# Patient Record
Sex: FEMALE | Race: WHITE | HISPANIC OR LATINO | Employment: UNEMPLOYED | URBAN - NONMETROPOLITAN AREA
[De-identification: names, ages, dates, MRNs, and addresses within clinical notes are randomized per-mention and may not be internally consistent; named-entity substitution may affect disease eponyms.]

---

## 2019-04-11 LAB — FINAL PATHOLOGIC DIAGNOSIS: NORMAL

## 2022-10-31 LAB
C TRACH DNA SPEC QL NAA+PROBE: NORMAL
N GONORRHOEA DNA SPEC QL NAA+PROBE: NORMAL

## 2022-12-13 LAB
GLUCOSE SERPL-MCNC: 151 MG/DL
HIV 1+2 AB+HIV1 P24 AG SERPL QL IA: NONREACTIVE
RUBELLA IMMUNE STATUS: NORMAL
T PALLIDUM AB SER QL: NONREACTIVE

## 2022-12-15 LAB
GLUCOSE, 1 HOUR: 192 MG/DL
GLUCOSE, 2 HOUR: 162 MG/DL
GLUCOSE, 3 HOUR: 112 MG/DL
GLUCOSE, FASTING: 91 MG/DL

## 2023-01-04 LAB
GLUCOSE SERPL-MCNC: 151 MG/DL
GLUCOSE, 1 HOUR: 192 MG/DL
GLUCOSE, 2 HOUR: 162 MG/DL
GLUCOSE, 3 HOUR: 112 MG/DL
GLUCOSE, FASTING: 91 MG/DL

## 2023-04-21 LAB
ABO + RH BLD: NORMAL
ANTIBODY SCREEN: NORMAL

## 2023-04-27 ENCOUNTER — ROUTINE PRENATAL (OUTPATIENT)
Dept: OBSTETRICS AND GYNECOLOGY | Facility: CLINIC | Age: 28
End: 2023-04-27
Payer: MEDICAID

## 2023-04-27 VITALS — WEIGHT: 255 LBS | SYSTOLIC BLOOD PRESSURE: 132 MMHG | DIASTOLIC BLOOD PRESSURE: 70 MMHG

## 2023-04-27 DIAGNOSIS — Z98.891 HISTORY OF CESAREAN SECTION: ICD-10-CM

## 2023-04-27 DIAGNOSIS — O09.299 PREGNANCY WITH POOR OBSTETRIC HISTORY: ICD-10-CM

## 2023-04-27 DIAGNOSIS — O24.410 DIET CONTROLLED GESTATIONAL DIABETES MELLITUS (GDM) IN THIRD TRIMESTER: ICD-10-CM

## 2023-04-27 DIAGNOSIS — O99.213 OBESITY AFFECTING PREGNANCY IN THIRD TRIMESTER: ICD-10-CM

## 2023-04-27 DIAGNOSIS — Z3A.30 30 WEEKS GESTATION OF PREGNANCY: Primary | ICD-10-CM

## 2023-04-27 DIAGNOSIS — Z30.09 UNWANTED FERTILITY: ICD-10-CM

## 2023-04-27 PROCEDURE — 99999 PR PBB SHADOW E&M-NEW PATIENT-LVL III: CPT | Mod: PBBFAC,,, | Performed by: OBSTETRICS & GYNECOLOGY

## 2023-04-27 PROCEDURE — 99204 OFFICE O/P NEW MOD 45 MIN: CPT | Mod: TH,S$PBB,, | Performed by: OBSTETRICS & GYNECOLOGY

## 2023-04-27 PROCEDURE — 99204 PR OFFICE/OUTPT VISIT, NEW, LEVL IV, 45-59 MIN: ICD-10-PCS | Mod: TH,S$PBB,, | Performed by: OBSTETRICS & GYNECOLOGY

## 2023-04-27 PROCEDURE — 99203 OFFICE O/P NEW LOW 30 MIN: CPT | Mod: PBBFAC,TH | Performed by: OBSTETRICS & GYNECOLOGY

## 2023-04-27 PROCEDURE — 99999 PR PBB SHADOW E&M-NEW PATIENT-LVL III: ICD-10-PCS | Mod: PBBFAC,,, | Performed by: OBSTETRICS & GYNECOLOGY

## 2023-04-27 RX ORDER — ASPIRIN 325 MG
325 TABLET, DELAYED RELEASE (ENTERIC COATED) ORAL DAILY
COMMUNITY
End: 2023-04-27 | Stop reason: DRUGHIGH

## 2023-04-27 RX ORDER — ASPIRIN 81 MG/1
81 TABLET ORAL DAILY
Qty: 150 TABLET | Refills: 2 | Status: SHIPPED | OUTPATIENT
Start: 2023-04-27 | End: 2023-04-30 | Stop reason: SDUPTHER

## 2023-04-27 NOTE — PROGRESS NOTES
New OB late transfer of care from [facility].  Patient with gestational diabetes that she reports diet controlled.  Blood sugar logs reviewed and within normal limits for the most part.  Occasional outlier ears especially after supper.  Discussed being more mindful of carb intake at that time.  She reports that she has had C-sections in the past and that she would like a tubal ligation during this delivery.  She states she was seeing MFM in [place].  Will send referral to MFM and Ophthalmology.  Refills of test strips and aspirin sent pharmacy.  All questions answered and precautions given.  Return to clinic in 2 weeks with BPP

## 2023-05-01 ENCOUNTER — PATIENT MESSAGE (OUTPATIENT)
Dept: OBSTETRICS AND GYNECOLOGY | Facility: CLINIC | Age: 28
End: 2023-05-01
Payer: MEDICAID

## 2023-05-08 ENCOUNTER — OFFICE VISIT (OUTPATIENT)
Dept: MATERNAL FETAL MEDICINE | Facility: CLINIC | Age: 28
End: 2023-05-08
Payer: MEDICAID

## 2023-05-08 ENCOUNTER — PROCEDURE VISIT (OUTPATIENT)
Dept: MATERNAL FETAL MEDICINE | Facility: CLINIC | Age: 28
End: 2023-05-08
Payer: MEDICAID

## 2023-05-08 VITALS
HEART RATE: 78 BPM | HEIGHT: 62 IN | SYSTOLIC BLOOD PRESSURE: 121 MMHG | BODY MASS INDEX: 47.51 KG/M2 | WEIGHT: 258.19 LBS | DIASTOLIC BLOOD PRESSURE: 64 MMHG

## 2023-05-08 DIAGNOSIS — O99.213 OBESITY AFFECTING PREGNANCY IN THIRD TRIMESTER: ICD-10-CM

## 2023-05-08 DIAGNOSIS — Z87.59 HISTORY OF GESTATIONAL HYPERTENSION: ICD-10-CM

## 2023-05-08 DIAGNOSIS — Z87.59 HISTORY OF PRIOR PREGNANCY WITH IUGR NEWBORN: ICD-10-CM

## 2023-05-08 DIAGNOSIS — O24.410 DIET CONTROLLED GESTATIONAL DIABETES MELLITUS (GDM) IN THIRD TRIMESTER: ICD-10-CM

## 2023-05-08 PROCEDURE — 3078F DIAST BP <80 MM HG: CPT | Mod: CPTII,S$GLB,, | Performed by: OBSTETRICS & GYNECOLOGY

## 2023-05-08 PROCEDURE — 99204 PR OFFICE/OUTPT VISIT, NEW, LEVL IV, 45-59 MIN: ICD-10-PCS | Mod: 25,TH,S$GLB, | Performed by: OBSTETRICS & GYNECOLOGY

## 2023-05-08 PROCEDURE — 1160F PR REVIEW ALL MEDS BY PRESCRIBER/CLIN PHARMACIST DOCUMENTED: ICD-10-PCS | Mod: CPTII,S$GLB,, | Performed by: OBSTETRICS & GYNECOLOGY

## 2023-05-08 PROCEDURE — 3008F BODY MASS INDEX DOCD: CPT | Mod: CPTII,S$GLB,, | Performed by: OBSTETRICS & GYNECOLOGY

## 2023-05-08 PROCEDURE — 76811 PR US, OB FETAL EVAL & EXAM, TRANSABDOM,FIRST GESTATION: ICD-10-PCS | Mod: 26,S$GLB,, | Performed by: OBSTETRICS & GYNECOLOGY

## 2023-05-08 PROCEDURE — 1159F MED LIST DOCD IN RCRD: CPT | Mod: CPTII,S$GLB,, | Performed by: OBSTETRICS & GYNECOLOGY

## 2023-05-08 PROCEDURE — 1159F PR MEDICATION LIST DOCUMENTED IN MEDICAL RECORD: ICD-10-PCS | Mod: CPTII,S$GLB,, | Performed by: OBSTETRICS & GYNECOLOGY

## 2023-05-08 PROCEDURE — 3078F PR MOST RECENT DIASTOLIC BLOOD PRESSURE < 80 MM HG: ICD-10-PCS | Mod: CPTII,S$GLB,, | Performed by: OBSTETRICS & GYNECOLOGY

## 2023-05-08 PROCEDURE — 1160F RVW MEDS BY RX/DR IN RCRD: CPT | Mod: CPTII,S$GLB,, | Performed by: OBSTETRICS & GYNECOLOGY

## 2023-05-08 PROCEDURE — 3008F PR BODY MASS INDEX (BMI) DOCUMENTED: ICD-10-PCS | Mod: CPTII,S$GLB,, | Performed by: OBSTETRICS & GYNECOLOGY

## 2023-05-08 PROCEDURE — 76811 OB US DETAILED SNGL FETUS: CPT | Mod: 26,S$GLB,, | Performed by: OBSTETRICS & GYNECOLOGY

## 2023-05-08 PROCEDURE — 3074F PR MOST RECENT SYSTOLIC BLOOD PRESSURE < 130 MM HG: ICD-10-PCS | Mod: CPTII,S$GLB,, | Performed by: OBSTETRICS & GYNECOLOGY

## 2023-05-08 PROCEDURE — 3074F SYST BP LT 130 MM HG: CPT | Mod: CPTII,S$GLB,, | Performed by: OBSTETRICS & GYNECOLOGY

## 2023-05-08 PROCEDURE — 99204 OFFICE O/P NEW MOD 45 MIN: CPT | Mod: 25,TH,S$GLB, | Performed by: OBSTETRICS & GYNECOLOGY

## 2023-05-08 RX ORDER — METFORMIN HYDROCHLORIDE 500 MG/1
500 TABLET ORAL
Qty: 30 TABLET | Refills: 1 | Status: ON HOLD | OUTPATIENT
Start: 2023-05-08 | End: 2023-06-18 | Stop reason: HOSPADM

## 2023-05-08 NOTE — ASSESSMENT & PLAN NOTE
The patient was counseled on the  risks associated with obesity which include and increased risk of hypertension, preeclampsia, gestational diabetes, venous thromboembolic disease,  delivery, macrosomia (with resultant shoulder dystocia, obstetric sphincter injury), IUFD, longer first stage of labor, decreased TOLAC success rate, emergent & scheduled  & complications of  (prolonged OR time, delayed delivery, excessive EBL, infection, wound separation/infection, higher spinal failure rate, more difficult intubation).  Obesity is also associated with fetal anomalies, specifically neural tube defects.  Studies have shown that the rate of complication increases with rising BMI and thus pregnancies with maternal morbid obesity are at increased risk.      BMI 47    Recommendations:   Discussed that TWG goal is 11-20 lbs   Screen for signs/symptoms of obstructive sleep apnea   Nutritionist consult offered (this is to be ordered by primary OB provider)   Start low dose aspirin 81 mg daily at 12-16 weeks for preeclampsia risk reduction   Lovenox 40 mg BID for VTE prophylaxis while admitted to the hospital (antepartum or postpartum) if BMI >= 40.    Encouraged breastfeeding   Postpartum lifestyle modifications & weight loss

## 2023-05-08 NOTE — ASSESSMENT & PLAN NOTE
She previously lived in [facility] and received primary OB and MFM care there.  She recently relocated to [place]. She was diagnosed with diabetes at 10 weeks EGA.  All baseline testing was completed by her previous providers.    The patient was counseled regarding the risks of diabetes in pregnancy. These risks include but are not limited to an increased risk of , preeclampsia/gestational hypertension, fetal structural anomalies, macrosomia, prematurity, shoulder dystocia/birth injury,  hyperbilirubinemia and electrolyte issues, pulmonary immaturity and sudden stillbirth especially in those patients with poor glucose control. I also discussed with the patient the need for increased fetal surveillance with serial fetal growth assessments and third trimester fetal testing. I also reviewed the possibility of need for early delivery in those with poor glucose control or evidence of fetal growth abnormalities.    She has been diet controlled thus far and her pregnancy.  She presents with a sugar log today which demonstrates some values out of range.  A prescription of Metformin 500 mg with breakfast was sent to her pharmacy.    Recommendations:  Baseline evaluation (to be ordered by primary OB provider):   24 hour urine protein or urine P/C ratio, CBC, CMP (completed)   Maternal EKG; echocardiogram if BMI > 30 or EKG is abnormal (Initially completed EKG in [place] in early pregnancy. An arrhythmia was identified. Recommend repeat EKG assessment. If abnormal, refer to cardiology)   Maternal ophthalmic exam (if hasn't been performed in last year) and podiatry exam   Hemoglobin A1C every trimester (5.6 in 1st trimester)   Diabetic education referral and counseling re: goal blood sugars (< 95 mg/dl for fasting, < 120 mg/dl for 2 hour postprandial)  Medications:    Start low dose aspirin 81 mg daily at 12-16 weeks for preeclampsia risk reduction   1 mg folic acid daily   Regimen: Metformin  500mg QAM  Ultrasounds with MFM:   Targeted anatomy survey at 20 weeks   Serial growth ultrasounds q 4-6 weeks at 26-28 weeks    A fetal echocardiogram is recommended at 22-24 weeks with pediatric cardiology (completed in [place] and normal)    Initiate  surveillance at 32 weeks:    Weekly BPP or NST + AFV if good control.    If control is poor, twice weekly  fetal surveillance is recommended with BPP alternating with NST.   Delivery timing:   38 0/7 to 38 and 6/7 weeks if under good control without comorbidities   37 0/7 to 37 and 67 weeks if longstanding diabetes or poorly controlled, polyhydramnios, EFW>90th percentile, or BMI >= 40   36 0/7 to 37 and 6/7 weeks if vascular complications, prior stillbirth or other complicating conditions.  Recommend consideration of earlier delivery if IUGR, HTN, or other complications  Recommend offering  for delivery is EFW is 4500g or more near the time of delivery    Insulin management during labor and delivery  -Give usual dose of intermediate acting (NPH) or long-acting insulin at bedtime  -Hold morning dose of insulin or reduce to ½ dose   -Patients who require insulin should be scheduled as the first available (7 am or 7:30 am)  given the need for NPO status  -Check glucose every 2-4 hours in latent labor; hourly in active labor  -If blood glucose is less than 70 mg/dl, change IVF to D5 ½ NS at rate of 100-150 cc/hr and monitor blood glucose hourly   -IV infusion of regular insulin is recommended if glucose levels exceed 120 mg/dl  -Patients who are well-controlled with an insulin pump can continue during labor and delivery. Recommend primary OB ensure pump site is out of operative field and confirm pump is compatible with and able to be left on during surgery.    The patient was advised to call for blood sugars less than 70 or greater than 200 prior to her next appointment. She was also advised that if she notes  nausea/vomiting, inability to tolerate PO, or concerns about being able to take her insulin that she should contact her provider or present to the OB ED.

## 2023-05-08 NOTE — ASSESSMENT & PLAN NOTE
I reviewed the patient's obstetric history which is remarkable for a previous pregnancy complicated by FGR. Other conditions noted during that pregnancy include:  Gestational hypertension and oligohydramnios. I counseled the patient regarding the recurrence risk for FGR (however the exact number is not clear, likely ~15-20% recurrence.). We discussed recommendations for management during this pregnancy including evaluation of fetal growth in the third trimester. Low molecular weight heparin and aspirin have not been beneficial in preventing recurrent FGR. If the patient had preeclampsia or gestational hypertension associated with FGR, low dose aspirin therapy should be initiated at 12-16 weeks in subsequent pregnancies. Patient was counseled on modifiable risk factors including tobacco cessation, abstinence from alcohol and drug use, and maternal diet (ensuring appropriate gestational weight gain).     Recommendations:  -Fetal growth ultrasounds in third trimester, every 4-6 weeks  -Antepartum surveillance is not indicated in absence of FGR diagnosis or other maternal-fetal indications for prenatal testing  -Monitor gestational weight gain, encourage prenatal vitamin

## 2023-05-08 NOTE — PROGRESS NOTES
MATERNAL-FETAL MEDICINE   CONSULT NOTE    Provider requesting consultation: Dr Matthews    SUBJECTIVE:     Ms. Shawna Oseguera is a 28 y.o.  female with IUP at 31w5d who is seen in consultation by MFM for evaluation and management of:  Problem   1. Diet controlled gestational diabetes mellitus (GDM) in third trimester   2. History of prior pregnancy with IUGR    3. History of gestational hypertension   4. BMI affecting pregnancy in third trimester     She was sent here for consultation due to gestational diabetes diagnosed in the 1st trimester.  She is controlled with diet and has a log with her today.  Her log demonstrates some values out of range.  Her recent A1c was 5.6 in the 1st trimester.  She had baseline testing that was normal.    She previously had a fetal echo in [facility] where she previously lived which was reportedly normal.  She also had an EKG done due to unknown reasons and she was noted to have a maternal arrhythmia of some kind.    She has 2 prior C-sections.  She has a history of gestational hypertension x2.  She is taking low-dose aspirin.  She has a history of IUGR at 5 pounds 15 ounces as well as a history of oligohydramnios in the setting of gestational hypertension.       Medication List with Changes/Refills   New Medications    METFORMIN (GLUCOPHAGE) 500 MG TABLET    Take 1 tablet (500 mg total) by mouth daily with breakfast. W/ DIABETIC SNACK   Current Medications    ASPIRIN (ECOTRIN) 81 MG EC TABLET    Take 1 tablet (81 mg total) by mouth once daily.    BLOOD SUGAR DIAGNOSTIC STRP    1 each by Misc.(Non-Drug; Combo Route) route 4 (four) times daily.    PRENATAL VIT/IRON FUM/FOLIC AC (PRENATAL 1+1 ORAL)    Take by mouth.       Review of patient's allergies indicates:  No Known Allergies    PMH:History reviewed. No pertinent past medical history.    PObHx:  OB History    Para Term  AB Living   4 3 2     2   SAB IAB Ectopic Multiple Live Births           " 2      # Outcome Date GA Lbr Colt/2nd Weight Sex Delivery Anes PTL Lv   4 Current            3 Term  38w0d  3.204 kg (7 lb 1 oz) M CS-LTranv   CAT      Birth Comments: GHTN   2 Term  37w0d  2.693 kg (5 lb 15 oz) M CS-Unspec   CAT      Birth Comments: oligo, GHTN   1 Para                PSH:  Past Surgical History:   Procedure Laterality Date     SECTION         Family history:family history includes Diabetes in her mother and sister; Hypertension in her mother and sister.    Social history: reports that she has never smoked. She has never used smokeless tobacco. She reports that she does not currently use alcohol. She reports that she does not use drugs.    Genetic history: The patient denies any inherited genetic diseases or birth defects in herself or her partner's personal history or family.    Objective:   /64 (BP Location: Right arm)   Pulse 78   Ht 5' 2" (1.575 m)   Wt 117.1 kg (258 lb 2.5 oz)   BMI 47.22 kg/m²     Ultrasound performed. See viewpoint for full ultrasound report.    A detailed fetal anatomic ultrasound examination was performed for the following high risk indication: Obesity, suspected VSD.   The fetus is in cephalic presentation.  Perimembranous ventricular septal defect is suspected. no other abnormalities are suspected.  Anatomy remain suboptimal for multiple areas as noted above due to advanced gestational age.  A follow-up study will be scheduled to complete the fetal anatomic survey.   Estimated fetal weight is at the 38th percentile.  Abdominal circumference is at the 80th percentile.  Amniotic fluid volume is normal.  Cervical length by TA scanning is normal.   Placental location is posterior without evidence of previa.             ASSESSMENT/PLAN:     28 y.o.  female with IUP at 31w5d     1. Diet controlled gestational diabetes mellitus (GDM) in third trimester  She previously lived in [facility] and received primary OB and MFM care there.  She " recently relocated to [place]. She was diagnosed with diabetes at 10 weeks EGA.  All baseline testing was completed by her previous providers.    The patient was counseled regarding the risks of diabetes in pregnancy. These risks include but are not limited to an increased risk of , preeclampsia/gestational hypertension, fetal structural anomalies, macrosomia, prematurity, shoulder dystocia/birth injury,  hyperbilirubinemia and electrolyte issues, pulmonary immaturity and sudden stillbirth especially in those patients with poor glucose control. I also discussed with the patient the need for increased fetal surveillance with serial fetal growth assessments and third trimester fetal testing. I also reviewed the possibility of need for early delivery in those with poor glucose control or evidence of fetal growth abnormalities.    She has been diet controlled thus far and her pregnancy.  She presents with a sugar log today which demonstrates some values out of range.  A prescription of Metformin 500 mg with breakfast was sent to her pharmacy.    Recommendations:  Baseline evaluation (to be ordered by primary OB provider):  24 hour urine protein or urine P/C ratio, CBC, CMP (completed)  Maternal EKG; echocardiogram if BMI > 30 or EKG is abnormal (Initially completed EKG in [place] in early pregnancy. An arrhythmia was identified. Recommend repeat EKG assessment. If abnormal, refer to cardiology)  Maternal ophthalmic exam (if hasn't been performed in last year) and podiatry exam  Hemoglobin A1C every trimester (5.6 in 1st trimester)  Diabetic education referral and counseling re: goal blood sugars (< 95 mg/dl for fasting, < 120 mg/dl for 2 hour postprandial)  Medications:   Start low dose aspirin 81 mg daily at 12-16 weeks for preeclampsia risk reduction  1 mg folic acid daily  Regimen: Metformin 500mg QAM  Ultrasounds with MFM:  Targeted anatomy survey at 20 weeks  Serial growth ultrasounds q 4-6  weeks at 26-28 weeks    A fetal echocardiogram is recommended at 22-24 weeks with pediatric cardiology (completed in [place] and normal)    Initiate  surveillance at 32 weeks:   Weekly BPP or NST + AFV if good control.   If control is poor, twice weekly  fetal surveillance is recommended with BPP alternating with NST.   Delivery timin 0/7 to 38 and 6/7 weeks if under good control without comorbidities  37 0/7 to 37 and 67 weeks if longstanding diabetes or poorly controlled, polyhydramnios, EFW>90th percentile, or BMI >= 40  36 0/7 to 37 and 6/7 weeks if vascular complications, prior stillbirth or other complicating conditions.  Recommend consideration of earlier delivery if IUGR, HTN, or other complications  Recommend offering  for delivery is EFW is 4500g or more near the time of delivery    Insulin management during labor and delivery  -Give usual dose of intermediate acting (NPH) or long-acting insulin at bedtime  -Hold morning dose of insulin or reduce to ½ dose   -Patients who require insulin should be scheduled as the first available (7 am or 7:30 am)  given the need for NPO status  -Check glucose every 2-4 hours in latent labor; hourly in active labor  -If blood glucose is less than 70 mg/dl, change IVF to D5 ½ NS at rate of 100-150 cc/hr and monitor blood glucose hourly   -IV infusion of regular insulin is recommended if glucose levels exceed 120 mg/dl  -Patients who are well-controlled with an insulin pump can continue during labor and delivery. Recommend primary OB ensure pump site is out of operative field and confirm pump is compatible with and able to be left on during surgery.    The patient was advised to call for blood sugars less than 70 or greater than 200 prior to her next appointment. She was also advised that if she notes nausea/vomiting, inability to tolerate PO, or concerns about being able to take her insulin that she should contact her provider or  present to the OB ED.        2. History of prior pregnancy with IUGR   I reviewed the patient's obstetric history which is remarkable for a previous pregnancy complicated by FGR. Other conditions noted during that pregnancy include:  Gestational hypertension and oligohydramnios. I counseled the patient regarding the recurrence risk for FGR (however the exact number is not clear, likely ~15-20% recurrence.). We discussed recommendations for management during this pregnancy including evaluation of fetal growth in the third trimester. Low molecular weight heparin and aspirin have not been beneficial in preventing recurrent FGR. If the patient had preeclampsia or gestational hypertension associated with FGR, low dose aspirin therapy should be initiated at 12-16 weeks in subsequent pregnancies. Patient was counseled on modifiable risk factors including tobacco cessation, abstinence from alcohol and drug use, and maternal diet (ensuring appropriate gestational weight gain).     Recommendations:  -Fetal growth ultrasounds in third trimester, every 4-6 weeks  -Antepartum surveillance is not indicated in absence of FGR diagnosis or other maternal-fetal indications for prenatal testing  -Monitor gestational weight gain, encourage prenatal vitamin      3. History of gestational hypertension  I reviewed the patient's obstetric history which is remarkable for development of gestational hypertension in the later part of her prior pregnancies. We reviewed the risk of recurrence in subsequent pregnancies. Subsequent pregnancies in women with severe preeclampsia are at risk of other  complications including abruption,  birth, FGR, and increased  mortality. The patient's blood pressure normalized following delivery. We reviewed the role of low dose aspirin therapy in regards to preeclampsia risk reduction in subsequent pregnancies.    Recommendations:  Start aspirin 81 mg daily at 12-16 weeks for  preeclampsia risk reduction  Obtain baseline preeclampsia studies: (CMP, CBC, 24 hour urine protein or urine protein/creatinine ratio-completed)  Close surveillance for signs/symptoms of preeclampsia in second/third trimester and postpartum period      4. BMI affecting pregnancy in third trimester  The patient was counseled on the  risks associated with obesity which include and increased risk of hypertension, preeclampsia, gestational diabetes, venous thromboembolic disease,  delivery, macrosomia (with resultant shoulder dystocia, obstetric sphincter injury), IUFD, longer first stage of labor, decreased TOLAC success rate, emergent & scheduled  & complications of  (prolonged OR time, delayed delivery, excessive EBL, infection, wound separation/infection, higher spinal failure rate, more difficult intubation).  Obesity is also associated with fetal anomalies, specifically neural tube defects.  Studies have shown that the rate of complication increases with rising BMI and thus pregnancies with maternal morbid obesity are at increased risk.      BMI 47    Recommendations:  Discussed that TWG goal is 11-20 lbs  Screen for signs/symptoms of obstructive sleep apnea  Nutritionist consult offered (this is to be ordered by primary OB provider)  Start low dose aspirin 81 mg daily at 12-16 weeks for preeclampsia risk reduction  Lovenox 40 mg BID for VTE prophylaxis while admitted to the hospital (antepartum or postpartum) if BMI >= 40.   Encouraged breastfeeding  Postpartum lifestyle modifications & weight loss        FOLLOW UP:   F/u in 4 weeks for US/MFM visit      This consultation was completed with the assistance of Joy Green NP.      Breonna Dao MD  Maternal Fetal Medicine

## 2023-05-08 NOTE — ASSESSMENT & PLAN NOTE
I reviewed the patient's obstetric history which is remarkable for development of gestational hypertension in the later part of her prior pregnancies. We reviewed the risk of recurrence in subsequent pregnancies. Subsequent pregnancies in women with severe preeclampsia are at risk of other  complications including abruption,  birth, FGR, and increased  mortality. The patient's blood pressure normalized following delivery. We reviewed the role of low dose aspirin therapy in regards to preeclampsia risk reduction in subsequent pregnancies.    Recommendations:   Start aspirin 81 mg daily at 12-16 weeks for preeclampsia risk reduction   Obtain baseline preeclampsia studies: (CMP, CBC, 24 hour urine protein or urine protein/creatinine ratio-completed)   Close surveillance for signs/symptoms of preeclampsia in second/third trimester and postpartum period

## 2023-05-10 ENCOUNTER — PATIENT MESSAGE (OUTPATIENT)
Dept: OTHER | Facility: OTHER | Age: 28
End: 2023-05-10
Payer: MEDICAID

## 2023-05-11 ENCOUNTER — ROUTINE PRENATAL (OUTPATIENT)
Dept: OBSTETRICS AND GYNECOLOGY | Facility: CLINIC | Age: 28
End: 2023-05-11
Payer: MEDICAID

## 2023-05-11 ENCOUNTER — PROCEDURE VISIT (OUTPATIENT)
Dept: OBSTETRICS AND GYNECOLOGY | Facility: CLINIC | Age: 28
End: 2023-05-11
Payer: MEDICAID

## 2023-05-11 VITALS
HEART RATE: 71 BPM | WEIGHT: 257 LBS | SYSTOLIC BLOOD PRESSURE: 121 MMHG | DIASTOLIC BLOOD PRESSURE: 59 MMHG | BODY MASS INDEX: 47.01 KG/M2

## 2023-05-11 DIAGNOSIS — Z92.89 HISTORY OF FETAL BIOPHYSICAL PROFILE: Primary | ICD-10-CM

## 2023-05-11 DIAGNOSIS — O99.213 OBESITY AFFECTING PREGNANCY IN THIRD TRIMESTER: ICD-10-CM

## 2023-05-11 DIAGNOSIS — Z3A.32 32 WEEKS GESTATION OF PREGNANCY: Primary | ICD-10-CM

## 2023-05-11 DIAGNOSIS — Z98.891 HISTORY OF CESAREAN SECTION: ICD-10-CM

## 2023-05-11 DIAGNOSIS — Z30.09 UNWANTED FERTILITY: ICD-10-CM

## 2023-05-11 DIAGNOSIS — O09.299 PREGNANCY WITH POOR OBSTETRIC HISTORY: ICD-10-CM

## 2023-05-11 DIAGNOSIS — O24.410 DIET CONTROLLED GESTATIONAL DIABETES MELLITUS (GDM) IN THIRD TRIMESTER: ICD-10-CM

## 2023-05-11 PROCEDURE — 99213 PR OFFICE/OUTPT VISIT, EST, LEVL III, 20-29 MIN: ICD-10-PCS | Mod: 25,TH,S$PBB, | Performed by: OBSTETRICS & GYNECOLOGY

## 2023-05-11 PROCEDURE — 99999 PR PBB SHADOW E&M-EST. PATIENT-LVL II: CPT | Mod: PBBFAC,,, | Performed by: OBSTETRICS & GYNECOLOGY

## 2023-05-11 PROCEDURE — 99999 PR PBB SHADOW E&M-EST. PATIENT-LVL II: ICD-10-PCS | Mod: PBBFAC,,, | Performed by: OBSTETRICS & GYNECOLOGY

## 2023-05-11 PROCEDURE — 76819 FETAL BIOPHYS PROFIL W/O NST: CPT | Mod: PBBFAC | Performed by: OBSTETRICS & GYNECOLOGY

## 2023-05-11 PROCEDURE — 99213 OFFICE O/P EST LOW 20 MIN: CPT | Mod: 25,TH,S$PBB, | Performed by: OBSTETRICS & GYNECOLOGY

## 2023-05-11 PROCEDURE — 99212 OFFICE O/P EST SF 10 MIN: CPT | Mod: PBBFAC,TH,25 | Performed by: OBSTETRICS & GYNECOLOGY

## 2023-05-11 PROCEDURE — 76819 US OB/GYN EXTENDED PROCEDURE (VIEWPOINT): ICD-10-PCS | Mod: 26,S$PBB,, | Performed by: OBSTETRICS & GYNECOLOGY

## 2023-05-11 NOTE — PROGRESS NOTES
Feeling well.  No complaints.  Blood sugar log reviewed and had 1 outlier-she ate pancakes with honey that day.  Discussed proper food choices and how to better handle them.  BPP today 8/8.  All questions answered and precautions given

## 2023-05-17 ENCOUNTER — PROCEDURE VISIT (OUTPATIENT)
Dept: OBSTETRICS AND GYNECOLOGY | Facility: CLINIC | Age: 28
End: 2023-05-17
Payer: MEDICAID

## 2023-05-17 ENCOUNTER — ROUTINE PRENATAL (OUTPATIENT)
Dept: OBSTETRICS AND GYNECOLOGY | Facility: CLINIC | Age: 28
End: 2023-05-17
Payer: MEDICAID

## 2023-05-17 ENCOUNTER — PATIENT MESSAGE (OUTPATIENT)
Dept: MATERNAL FETAL MEDICINE | Facility: CLINIC | Age: 28
End: 2023-05-17
Payer: MEDICAID

## 2023-05-17 VITALS
BODY MASS INDEX: 46.82 KG/M2 | HEART RATE: 70 BPM | SYSTOLIC BLOOD PRESSURE: 122 MMHG | WEIGHT: 256 LBS | DIASTOLIC BLOOD PRESSURE: 59 MMHG

## 2023-05-17 DIAGNOSIS — Z36.89 SCREENING, ANTENATAL, FOR FETAL ANATOMIC SURVEY: ICD-10-CM

## 2023-05-17 DIAGNOSIS — Z92.89 HISTORY OF FETAL BIOPHYSICAL PROFILE: Primary | ICD-10-CM

## 2023-05-17 DIAGNOSIS — O99.213 OBESITY AFFECTING PREGNANCY IN THIRD TRIMESTER: ICD-10-CM

## 2023-05-17 DIAGNOSIS — Z92.89 HISTORY OF FETAL BIOPHYSICAL PROFILE: ICD-10-CM

## 2023-05-17 DIAGNOSIS — Z3A.33 33 WEEKS GESTATION OF PREGNANCY: Primary | ICD-10-CM

## 2023-05-17 DIAGNOSIS — O24.410 DIET CONTROLLED GESTATIONAL DIABETES MELLITUS (GDM) IN THIRD TRIMESTER: ICD-10-CM

## 2023-05-17 DIAGNOSIS — Z23 NEED FOR TDAP VACCINATION: ICD-10-CM

## 2023-05-17 DIAGNOSIS — Z30.09 UNWANTED FERTILITY: ICD-10-CM

## 2023-05-17 DIAGNOSIS — Z98.891 HISTORY OF CESAREAN SECTION: ICD-10-CM

## 2023-05-17 LAB
ABO + RH BLD: NORMAL
BLD GP AB SCN CELLS X3 SERPL QL: NORMAL
SPECIMEN OUTDATE: NORMAL

## 2023-05-17 PROCEDURE — 87389 HIV-1 AG W/HIV-1&-2 AB AG IA: CPT | Performed by: OBSTETRICS & GYNECOLOGY

## 2023-05-17 PROCEDURE — 76819 US OB/GYN EXTENDED PROCEDURE (VIEWPOINT): ICD-10-PCS | Mod: 26,S$PBB,, | Performed by: OBSTETRICS & GYNECOLOGY

## 2023-05-17 PROCEDURE — 99213 PR OFFICE/OUTPT VISIT, EST, LEVL III, 20-29 MIN: ICD-10-PCS | Mod: 25,TH,S$PBB, | Performed by: OBSTETRICS & GYNECOLOGY

## 2023-05-17 PROCEDURE — 90471 IMMUNIZATION ADMIN: CPT | Mod: PBBFAC

## 2023-05-17 PROCEDURE — 99213 OFFICE O/P EST LOW 20 MIN: CPT | Mod: 25,TH,S$PBB, | Performed by: OBSTETRICS & GYNECOLOGY

## 2023-05-17 PROCEDURE — 99213 OFFICE O/P EST LOW 20 MIN: CPT | Mod: PBBFAC,TH,25 | Performed by: OBSTETRICS & GYNECOLOGY

## 2023-05-17 PROCEDURE — 99999 PR PBB SHADOW E&M-EST. PATIENT-LVL III: ICD-10-PCS | Mod: PBBFAC,,, | Performed by: OBSTETRICS & GYNECOLOGY

## 2023-05-17 PROCEDURE — 76819 FETAL BIOPHYS PROFIL W/O NST: CPT | Mod: PBBFAC | Performed by: OBSTETRICS & GYNECOLOGY

## 2023-05-17 PROCEDURE — 86900 BLOOD TYPING SEROLOGIC ABO: CPT | Performed by: OBSTETRICS & GYNECOLOGY

## 2023-05-17 PROCEDURE — 90715 TDAP VACCINE 7 YRS/> IM: CPT | Mod: PBBFAC

## 2023-05-17 PROCEDURE — 99999 PR PBB SHADOW E&M-EST. PATIENT-LVL III: CPT | Mod: PBBFAC,,, | Performed by: OBSTETRICS & GYNECOLOGY

## 2023-05-17 NOTE — NURSING
Tdap given to left deltoid. Pt tolerated well, no c/o. Pt instructed to wait 15 minutes before leaving clinic to insure no reaction to vaccine. Pt verbalized understanding.

## 2023-05-17 NOTE — PROGRESS NOTES
No complaints today.  Tdap vaccine given today.  Patient states she was begun on metformin at her last visit with [facility].  She is tolerating medication well and blood sugar log shows values within normal limits.  Reassurance and encouragement given.      BPP today 8/8 and SHERLY of 7 cm.  Of note previous SHERLY was around 16 cm.  Patient reports no leakage of fluid.  Will continue to monitor fluid levels weekly.  All questions answered and precautions given.

## 2023-05-18 LAB — HIV 1+2 AB+HIV1 P24 AG SERPL QL IA: NORMAL

## 2023-05-25 ENCOUNTER — ROUTINE PRENATAL (OUTPATIENT)
Dept: OBSTETRICS AND GYNECOLOGY | Facility: CLINIC | Age: 28
End: 2023-05-25
Payer: MEDICAID

## 2023-05-25 ENCOUNTER — PROCEDURE VISIT (OUTPATIENT)
Dept: OBSTETRICS AND GYNECOLOGY | Facility: CLINIC | Age: 28
End: 2023-05-25
Payer: MEDICAID

## 2023-05-25 VITALS — WEIGHT: 257 LBS | BODY MASS INDEX: 47.01 KG/M2 | SYSTOLIC BLOOD PRESSURE: 108 MMHG | DIASTOLIC BLOOD PRESSURE: 64 MMHG

## 2023-05-25 DIAGNOSIS — Z92.89 HISTORY OF FETAL BIOPHYSICAL PROFILE: Primary | ICD-10-CM

## 2023-05-25 DIAGNOSIS — O99.213 OBESITY AFFECTING PREGNANCY IN THIRD TRIMESTER: ICD-10-CM

## 2023-05-25 DIAGNOSIS — O24.410 DIET CONTROLLED GESTATIONAL DIABETES MELLITUS (GDM) IN THIRD TRIMESTER: ICD-10-CM

## 2023-05-25 DIAGNOSIS — O09.299 PREGNANCY WITH POOR OBSTETRIC HISTORY: ICD-10-CM

## 2023-05-25 DIAGNOSIS — Z92.89 HISTORY OF FETAL BIOPHYSICAL PROFILE: ICD-10-CM

## 2023-05-25 DIAGNOSIS — Z3A.34 34 WEEKS GESTATION OF PREGNANCY: Primary | ICD-10-CM

## 2023-05-25 DIAGNOSIS — Z30.09 UNWANTED FERTILITY: ICD-10-CM

## 2023-05-25 DIAGNOSIS — Z98.891 HISTORY OF CESAREAN SECTION: ICD-10-CM

## 2023-05-25 PROCEDURE — 76819 FETAL BIOPHYS PROFIL W/O NST: CPT | Mod: PBBFAC | Performed by: OBSTETRICS & GYNECOLOGY

## 2023-05-25 PROCEDURE — 99999 PR PBB SHADOW E&M-EST. PATIENT-LVL II: ICD-10-PCS | Mod: PBBFAC,,, | Performed by: OBSTETRICS & GYNECOLOGY

## 2023-05-25 PROCEDURE — 99212 OFFICE O/P EST SF 10 MIN: CPT | Mod: PBBFAC,TH,25 | Performed by: OBSTETRICS & GYNECOLOGY

## 2023-05-25 PROCEDURE — 99999 PR PBB SHADOW E&M-EST. PATIENT-LVL II: CPT | Mod: PBBFAC,,, | Performed by: OBSTETRICS & GYNECOLOGY

## 2023-05-25 PROCEDURE — 99213 PR OFFICE/OUTPT VISIT, EST, LEVL III, 20-29 MIN: ICD-10-PCS | Mod: 25,TH,S$PBB, | Performed by: OBSTETRICS & GYNECOLOGY

## 2023-05-25 PROCEDURE — 76819 US OB/GYN EXTENDED PROCEDURE (VIEWPOINT): ICD-10-PCS | Mod: 26,S$PBB,, | Performed by: OBSTETRICS & GYNECOLOGY

## 2023-05-25 PROCEDURE — 99213 OFFICE O/P EST LOW 20 MIN: CPT | Mod: 25,TH,S$PBB, | Performed by: OBSTETRICS & GYNECOLOGY

## 2023-05-25 NOTE — PROGRESS NOTES
Patient without complaints today.  Blood sugar log reviewed and all within normal limits.  Patient states she forgot to take her blood sugar fasting this morning.  All other values have been within normal limits over the past week.  BPP today 8/8.    Patient states that she wants to have gastric bypass surgery after delivery and she is asking how long she has to wait until she can have this surgery.  I explained that she at least needs to be about 8 weeks postpartum as well as no longer breastfeeding.  I also explained it is ultimately up to this discretion of the operating physician.  She states she would like to be referred to a provider who performed gastric bypass.  All questions answered and precautions given.  Return to clinic next week with blood sugar log and for BPP

## 2023-05-26 ENCOUNTER — PATIENT MESSAGE (OUTPATIENT)
Dept: MATERNAL FETAL MEDICINE | Facility: CLINIC | Age: 28
End: 2023-05-26
Payer: MEDICAID

## 2023-05-29 NOTE — TELEPHONE ENCOUNTER
I called the Language line cecelia spoke with Chau #[id number], we called pt he translated for me explaining to pt who I was and the facility I was calling from and the reasoning for my call pt verbalized understanding, I discussed with pt that we rec. Her checking her BS QID I explained to pt the reasoning and the importance of doing this pt verbalized understanding.   Occupational Therapy  Daily Treatment     Patient Name: Gayla Escudero  Age:  48 y.o., Sex:  female  Medical Record #: [id number]  Today's Date: 12/24/2020     Precautions  Precautions: Fall Risk, Swallow Precautions ( See Comments)  Comments: myotonic MD    Assessment    Pt seen for OT tx today, slightly better today with encouragement to attempt self care tasks and to sit up in the chair post session. Had discussion with pt regarding her goals, limited progression with ADLs and mobility and this might be her new functional baseline, and will require 24/7 caregiving regardless at this point. Will attempt 1-2 more session to see if pt can progress further.     Plan    Continue current treatment plan.    DC Equipment Recommendations: Unable to determine at this time  Discharge Recommendations: Recommend post-acute placement for additional occupational therapy services prior to discharge home(24/7 caregiving, nearing her baseline)       Objective       12/24/20 0911   Cognition    Cognition / Consciousness X   Speech/ Communication Delayed Responses   Level of Consciousness Alert   Ability To Follow Commands 1 Step   Safety Awareness Impaired;Impulsive   New Learning Impaired   Attention Impaired   Sequencing Impaired   Initiation Impaired   Comments Needs constant vc's to vocalize her needs, and to attempt task for self. Slightly improved command following today    Other Treatments   Other Treatments Provided Discussed w/ pt regarding her goals and realistic expections giving underlying medical diagnoses. Pt vocalizing she wants to go home, walk around the house and to the bathroom    Balance   Sitting Balance (Static) Fair -   Sitting Balance (Dynamic) Poor -   Standing Balance (Static) Poor -   Standing Balance (Dynamic) Poor -   Weight Shift Sitting Fair   Weight Shift Standing Fair   Skilled Intervention Tactile Cuing;Verbal Cuing;Sequencing;Compensatory Strategies   Comments w/4WW, cues for body mechanics  and slight posterior lean   Bed Mobility    Supine to Sit Moderate Assist   Sit to Supine   (up in chair )   Scooting Minimal Assist   Rolling Minimal Assist to Rt.;Minimum Assist to Lt.   Skilled Intervention Verbal Cuing;Tactile Cuing;Sequencing;Compensatory Strategies   Comments raised hob and cues for sequencing    Activities of Daily Living   Grooming Minimal Assist;Seated  (for oral care, cues for sequencing)   Upper Body Dressing Minimal Assist  (for hospital gown managment)   Lower Body Dressing Maximal Assist  (don/doff socks)   Skilled Intervention Verbal Cuing;Tactile Cuing;Sequencing;Compensatory Strategies   Comments agreeable to attempt task   Functional Mobility   Sit to Stand Moderate Assist   Bed, Chair, Wheelchair Transfer Moderate Assist   Transfer Method Stand Step   Mobility bed mobility, eob->chair   Skilled Intervention Verbal Cuing;Tactile Cuing;Sequencing;Compensatory Strategies   Comments w/4WW   Short Term Goals   Short Term Goal # 1 Pt will perform LB dressing w/ min a   Goal Outcome # 1 Progressing slower than expected   Short Term Goal # 2 Pt will perform functional t/f  w/ min a    Goal Outcome # 2 Progressing slower than expected   Short Term Goal # 3 Pt will perform toileting task with min a   Goal Outcome # 3 Progressing slower than expected   Anticipated Discharge Equipment and Recommendations   DC Equipment Recommendations Unable to determine at this time

## 2023-05-31 ENCOUNTER — PATIENT MESSAGE (OUTPATIENT)
Dept: MATERNAL FETAL MEDICINE | Facility: CLINIC | Age: 28
End: 2023-05-31
Payer: MEDICAID

## 2023-05-31 ENCOUNTER — PATIENT MESSAGE (OUTPATIENT)
Dept: OBSTETRICS AND GYNECOLOGY | Facility: CLINIC | Age: 28
End: 2023-05-31
Payer: MEDICAID

## 2023-05-31 ENCOUNTER — HOSPITAL ENCOUNTER (EMERGENCY)
Facility: HOSPITAL | Age: 28
Discharge: HOME | End: 2023-05-31
Attending: EMERGENCY MEDICINE
Payer: MEDICAID

## 2023-05-31 ENCOUNTER — PATIENT MESSAGE (OUTPATIENT)
Dept: OTHER | Facility: OTHER | Age: 28
End: 2023-05-31
Payer: MEDICAID

## 2023-05-31 VITALS
BODY MASS INDEX: 48.21 KG/M2 | HEART RATE: 60 BPM | DIASTOLIC BLOOD PRESSURE: 70 MMHG | WEIGHT: 262 LBS | OXYGEN SATURATION: 98 % | RESPIRATION RATE: 18 BRPM | HEIGHT: 62 IN | SYSTOLIC BLOOD PRESSURE: 138 MMHG | TEMPERATURE: 99 F

## 2023-05-31 DIAGNOSIS — N30.01 ACUTE CYSTITIS WITH HEMATURIA: Primary | ICD-10-CM

## 2023-05-31 DIAGNOSIS — Z87.59 HISTORY OF GESTATIONAL HYPERTENSION: ICD-10-CM

## 2023-05-31 DIAGNOSIS — Z87.59 HISTORY OF PRIOR PREGNANCY WITH IUGR NEWBORN: ICD-10-CM

## 2023-05-31 DIAGNOSIS — O24.410 DIET CONTROLLED GESTATIONAL DIABETES MELLITUS (GDM) IN THIRD TRIMESTER: Primary | ICD-10-CM

## 2023-05-31 DIAGNOSIS — O99.213 OBESITY AFFECTING PREGNANCY IN THIRD TRIMESTER: ICD-10-CM

## 2023-05-31 DIAGNOSIS — O46.90 VAGINAL BLEEDING IN PREGNANCY: ICD-10-CM

## 2023-05-31 LAB
BACTERIA #/AREA URNS HPF: ABNORMAL /HPF
BASOPHILS # BLD AUTO: 0.03 K/UL (ref 0–0.2)
BASOPHILS NFR BLD: 0.4 % (ref 0–1.9)
BILIRUB UR QL STRIP: NEGATIVE
CLARITY UR: ABNORMAL
COLOR UR: YELLOW
DIFFERENTIAL METHOD: ABNORMAL
EOSINOPHIL # BLD AUTO: 0.1 K/UL (ref 0–0.5)
EOSINOPHIL NFR BLD: 0.8 % (ref 0–8)
ERYTHROCYTE [DISTWIDTH] IN BLOOD BY AUTOMATED COUNT: 13.4 % (ref 11.5–14.5)
GLUCOSE UR QL STRIP: NEGATIVE
HCG INTACT+B SERPL-ACNC: NORMAL MIU/ML
HCT VFR BLD AUTO: 41.8 % (ref 37–48.5)
HGB BLD-MCNC: 14.1 G/DL (ref 12–16)
HGB UR QL STRIP: ABNORMAL
HYALINE CASTS #/AREA URNS LPF: 2.3 /LPF
IMM GRANULOCYTES # BLD AUTO: 0.05 K/UL (ref 0–0.04)
IMM GRANULOCYTES NFR BLD AUTO: 0.6 % (ref 0–0.5)
KETONES UR QL STRIP: NEGATIVE
LEUKOCYTE ESTERASE UR QL STRIP: ABNORMAL
LYMPHOCYTES # BLD AUTO: 2 K/UL (ref 1–4.8)
LYMPHOCYTES NFR BLD: 24.2 % (ref 18–48)
MCH RBC QN AUTO: 30.6 PG (ref 27–31)
MCHC RBC AUTO-ENTMCNC: 33.7 G/DL (ref 32–36)
MCV RBC AUTO: 91 FL (ref 82–98)
MICROSCOPIC COMMENT: ABNORMAL
MONOCYTES # BLD AUTO: 0.5 K/UL (ref 0.3–1)
MONOCYTES NFR BLD: 6.3 % (ref 4–15)
NEUTROPHILS # BLD AUTO: 5.6 K/UL (ref 1.8–7.7)
NEUTROPHILS NFR BLD: 67.7 % (ref 38–73)
NITRITE UR QL STRIP: NEGATIVE
NRBC BLD-RTO: 0 /100 WBC
PH UR STRIP: 7 [PH] (ref 5–8)
PLATELET # BLD AUTO: 246 K/UL (ref 150–450)
PMV BLD AUTO: 11.4 FL (ref 9.2–12.9)
PROT UR QL STRIP: NEGATIVE
RBC # BLD AUTO: 4.61 M/UL (ref 4–5.4)
RBC #/AREA URNS HPF: 5 /HPF (ref 0–4)
RUPTURE OF MEMBRANE: NEGATIVE
SP GR UR STRIP: 1.01 (ref 1–1.03)
SQUAMOUS #/AREA URNS HPF: 15 /HPF
URN SPEC COLLECT METH UR: ABNORMAL
UROBILINOGEN UR STRIP-ACNC: 1 EU/DL
WBC # BLD AUTO: 8.24 K/UL (ref 3.9–12.7)
WBC #/AREA URNS HPF: 6 /HPF (ref 0–5)
YEAST URNS QL MICRO: ABNORMAL

## 2023-05-31 PROCEDURE — 99284 EMERGENCY DEPT VISIT MOD MDM: CPT | Mod: 25

## 2023-05-31 PROCEDURE — 81000 URINALYSIS NONAUTO W/SCOPE: CPT | Performed by: EMERGENCY MEDICINE

## 2023-05-31 PROCEDURE — 84702 CHORIONIC GONADOTROPIN TEST: CPT | Performed by: EMERGENCY MEDICINE

## 2023-05-31 PROCEDURE — 85025 COMPLETE CBC W/AUTO DIFF WBC: CPT | Performed by: EMERGENCY MEDICINE

## 2023-05-31 PROCEDURE — 84112 EVAL AMNIOTIC FLUID PROTEIN: CPT | Performed by: EMERGENCY MEDICINE

## 2023-05-31 PROCEDURE — 36415 COLL VENOUS BLD VENIPUNCTURE: CPT | Performed by: EMERGENCY MEDICINE

## 2023-05-31 RX ORDER — NITROFURANTOIN 25; 75 MG/1; MG/1
100 CAPSULE ORAL 2 TIMES DAILY
Qty: 10 CAPSULE | Refills: 0 | Status: SHIPPED | OUTPATIENT
Start: 2023-05-31 | End: 2023-06-05

## 2023-05-31 NOTE — ED PROVIDER NOTES
Encounter Date: 2023       History     Chief Complaint   Patient presents with    Vaginal Bleeding     Patient to the ER with complaints of vaginal bleeding. Patient is 34 weeks and 5 days. Dr. Matthews is patient's OBGYN.     28-year-old female  at 34 weeks 5 days presents the emergency department with mild pink discharge when she wipes after urination that began today.  Sees Dr. Matthews.  Denies any pain whatsoever, no contractions.  No bright red blood per vagina.  No fever.  No nausea vomiting.  Not ill appearing, alert and oriented x4, GCS is 15.  She is pleasant and polite    Review of patient's allergies indicates:  No Known Allergies  Past Medical History:   Diagnosis Date    Diabetes mellitus     Unspecified pre-eclampsia, unspecified trimester      Past Surgical History:   Procedure Laterality Date     SECTION       Family History   Problem Relation Age of Onset    Hypertension Mother     Diabetes Mother     Hypertension Sister     Diabetes Sister      Social History     Tobacco Use    Smoking status: Never    Smokeless tobacco: Never   Substance Use Topics    Alcohol use: Not Currently    Drug use: Never     Review of Systems   Constitutional:  Negative for fever.   HENT:  Negative for sore throat.    Respiratory:  Negative for shortness of breath.    Cardiovascular:  Negative for chest pain.   Gastrointestinal:  Negative for abdominal pain and nausea.   Genitourinary:  Negative for dysuria.   Musculoskeletal:  Negative for back pain.   Skin:  Negative for rash.   Neurological:  Negative for weakness.   Hematological:  Does not bruise/bleed easily.   All other systems reviewed and are negative.    Physical Exam     Initial Vitals [23 1505]   BP Pulse Resp Temp SpO2   138/70 62 18 98.6 °F (37 °C) 97 %      MAP       --         Physical Exam    Nursing note and vitals reviewed.  Constitutional: She appears well-developed and well-nourished. She is not diaphoretic. No distress.   HENT:    Head: Normocephalic and atraumatic.   Mouth/Throat: Oropharynx is clear and moist.   Eyes: Conjunctivae and EOM are normal. Pupils are equal, round, and reactive to light.   Neck: Neck supple. No tracheal deviation present. No JVD present.   Normal range of motion.  Cardiovascular:  Normal rate, regular rhythm, normal heart sounds and intact distal pulses.           No murmur heard.  Pulmonary/Chest: Breath sounds normal. No stridor. No respiratory distress. She has no wheezes. She has no rhonchi. She has no rales. She exhibits no tenderness.   Abdominal: Abdomen is soft. Bowel sounds are normal. There is no abdominal tenderness.   Gravid appearance There is no rebound and no guarding.   Musculoskeletal:         General: No tenderness or edema. Normal range of motion.      Cervical back: Normal range of motion and neck supple.     Neurological: She is alert and oriented to person, place, and time. She has normal strength. No cranial nerve deficit. GCS score is 15. GCS eye subscore is 4. GCS verbal subscore is 5. GCS motor subscore is 6.   Skin: Skin is warm and dry. Capillary refill takes less than 2 seconds.       ED Course   Procedures  Labs Reviewed   URINALYSIS, REFLEX TO URINE CULTURE - Abnormal; Notable for the following components:       Result Value    Appearance, UA Hazy (*)     Occult Blood UA 2+ (*)     Leukocytes, UA Trace (*)     All other components within normal limits    Narrative:     Preferred Collection Type->Urine, Clean Catch  Specimen Source->Urine   CBC W/ AUTO DIFFERENTIAL - Abnormal; Notable for the following components:    Immature Granulocytes 0.6 (*)     Immature Grans (Abs) 0.05 (*)     All other components within normal limits    Narrative:     Release to patient->Immediate   URINALYSIS MICROSCOPIC - Abnormal; Notable for the following components:    RBC, UA 5 (*)     WBC, UA 6 (*)     Yeast, UA Occasional (*)     Hyaline Casts, UA 2.3 (*)     All other components within normal limits     Narrative:     Preferred Collection Type->Urine, Clean Catch  Specimen Source->Urine   RUPTURE OF MEMBRANE   HCG, QUANTITATIVE          Imaging Results    None          Medications - No data to display  Medical Decision Making:   Differential Diagnosis:   UTI, mild bleeding of pregnancy  ED Management:  Patient has had an ultrasound recently that showed IUP without any abnormality    Patient checked by OB nurse patient is fingertip, minimal bleeding           ED Course as of 05/31/23 1619   Wed May 31, 2023   1616 Rupture of membranes are negative.  Urine consistent with minimal UTI.  Will treat with antibiotics and have her follow up with her primary care physician or OBGYN this week [SD]      ED Course User Index  [SD] Braxton Stevens MD                 Clinical Impression:   Final diagnoses:  [N30.01] Acute cystitis with hematuria (Primary)  [O46.90] Vaginal bleeding in pregnancy        ED Disposition Condition    Discharge Stable          ED Prescriptions       Medication Sig Dispense Start Date End Date Auth. Provider    nitrofurantoin, macrocrystal-monohydrate, (MACROBID) 100 MG capsule Take 1 capsule (100 mg total) by mouth 2 (two) times daily. for 5 days 10 capsule 5/31/2023 6/5/2023 Braxton Stevens MD          Follow-up Information       Follow up With Specialties Details Why Contact Info    Giuliana Matthews MD Obstetrics and Gynecology In 1 day  [facility]  [address] [place] [address]  [phone]               Braxton Stevens MD  05/31/23 5007

## 2023-06-01 ENCOUNTER — PROCEDURE VISIT (OUTPATIENT)
Dept: OBSTETRICS AND GYNECOLOGY | Facility: CLINIC | Age: 28
End: 2023-06-01
Payer: MEDICAID

## 2023-06-01 ENCOUNTER — ROUTINE PRENATAL (OUTPATIENT)
Dept: OBSTETRICS AND GYNECOLOGY | Facility: CLINIC | Age: 28
End: 2023-06-01
Payer: MEDICAID

## 2023-06-01 VITALS
HEART RATE: 80 BPM | SYSTOLIC BLOOD PRESSURE: 125 MMHG | DIASTOLIC BLOOD PRESSURE: 72 MMHG | WEIGHT: 259 LBS | BODY MASS INDEX: 47.37 KG/M2

## 2023-06-01 DIAGNOSIS — O99.213 OBESITY AFFECTING PREGNANCY IN THIRD TRIMESTER: ICD-10-CM

## 2023-06-01 DIAGNOSIS — Z30.09 UNWANTED FERTILITY: ICD-10-CM

## 2023-06-01 DIAGNOSIS — Z92.89 HISTORY OF FETAL BIOPHYSICAL PROFILE: ICD-10-CM

## 2023-06-01 DIAGNOSIS — O24.410 DIET CONTROLLED GESTATIONAL DIABETES MELLITUS (GDM) IN THIRD TRIMESTER: ICD-10-CM

## 2023-06-01 DIAGNOSIS — Z3A.35 35 WEEKS GESTATION OF PREGNANCY: Primary | ICD-10-CM

## 2023-06-01 DIAGNOSIS — O09.299 PREGNANCY WITH POOR OBSTETRIC HISTORY: ICD-10-CM

## 2023-06-01 DIAGNOSIS — Z98.891 HISTORY OF CESAREAN SECTION: ICD-10-CM

## 2023-06-01 DIAGNOSIS — Z92.89 HISTORY OF FETAL BIOPHYSICAL PROFILE: Primary | ICD-10-CM

## 2023-06-01 PROCEDURE — 99213 OFFICE O/P EST LOW 20 MIN: CPT | Mod: PBBFAC,TH | Performed by: OBSTETRICS & GYNECOLOGY

## 2023-06-01 PROCEDURE — 76819 FETAL BIOPHYS PROFIL W/O NST: CPT | Mod: 26,S$PBB,, | Performed by: OBSTETRICS & GYNECOLOGY

## 2023-06-01 PROCEDURE — 76816 OB US FOLLOW-UP PER FETUS: CPT | Mod: PBBFAC | Performed by: OBSTETRICS & GYNECOLOGY

## 2023-06-01 PROCEDURE — 99213 OFFICE O/P EST LOW 20 MIN: CPT | Mod: 25,TH,S$PBB, | Performed by: OBSTETRICS & GYNECOLOGY

## 2023-06-01 PROCEDURE — 76819 US OB/GYN EXTENDED PROCEDURE (VIEWPOINT): ICD-10-PCS | Mod: 26,S$PBB,, | Performed by: OBSTETRICS & GYNECOLOGY

## 2023-06-01 PROCEDURE — 99213 PR OFFICE/OUTPT VISIT, EST, LEVL III, 20-29 MIN: ICD-10-PCS | Mod: 25,TH,S$PBB, | Performed by: OBSTETRICS & GYNECOLOGY

## 2023-06-01 PROCEDURE — 76816 US OB/GYN EXTENDED PROCEDURE (VIEWPOINT): ICD-10-PCS | Mod: 26,S$PBB,, | Performed by: OBSTETRICS & GYNECOLOGY

## 2023-06-01 PROCEDURE — 99999 PR PBB SHADOW E&M-EST. PATIENT-LVL III: ICD-10-PCS | Mod: PBBFAC,,, | Performed by: OBSTETRICS & GYNECOLOGY

## 2023-06-01 PROCEDURE — 99999 PR PBB SHADOW E&M-EST. PATIENT-LVL III: CPT | Mod: PBBFAC,,, | Performed by: OBSTETRICS & GYNECOLOGY

## 2023-06-01 NOTE — PROGRESS NOTES
Patient had called yesterday with complaints of some vaginal bleeding and was instructed by the nurse to go to [facility] to be evaluated and monitored.  Patient presented to ER at [facility].  Was evaluated and treated for UTI.  Patient states she is feeling a bit better today.  BPP 8/8 and ultrasound shows good fetal growth.  Blood sugar log reviewed and patient admits to only sporadically checking blood sugars.  She states that she was moving to a new apartment and forgot to check from time to time.    Questions regarding delivery hospital and where to report if she has issues.  Reiterated that delivery hospital is [facility] in [place] and she should also report to the hospital if she has issues that require addressing.  All questions answered

## 2023-06-06 ENCOUNTER — PROCEDURE VISIT (OUTPATIENT)
Dept: MATERNAL FETAL MEDICINE | Facility: CLINIC | Age: 28
End: 2023-06-06
Payer: MEDICAID

## 2023-06-06 ENCOUNTER — OFFICE VISIT (OUTPATIENT)
Dept: MATERNAL FETAL MEDICINE | Facility: CLINIC | Age: 28
End: 2023-06-06
Payer: MEDICAID

## 2023-06-06 VITALS
DIASTOLIC BLOOD PRESSURE: 60 MMHG | SYSTOLIC BLOOD PRESSURE: 129 MMHG | WEIGHT: 262.69 LBS | HEART RATE: 82 BPM | BODY MASS INDEX: 48.04 KG/M2

## 2023-06-06 DIAGNOSIS — Z87.59 HISTORY OF GESTATIONAL HYPERTENSION: ICD-10-CM

## 2023-06-06 DIAGNOSIS — Z87.59 HISTORY OF PRIOR PREGNANCY WITH IUGR NEWBORN: ICD-10-CM

## 2023-06-06 DIAGNOSIS — O24.410 DIET CONTROLLED GESTATIONAL DIABETES MELLITUS (GDM) IN THIRD TRIMESTER: ICD-10-CM

## 2023-06-06 DIAGNOSIS — O24.415 GESTATIONAL DIABETES MELLITUS (GDM) IN THIRD TRIMESTER CONTROLLED ON ORAL HYPOGLYCEMIC DRUG: ICD-10-CM

## 2023-06-06 DIAGNOSIS — O99.213 OBESITY AFFECTING PREGNANCY IN THIRD TRIMESTER: ICD-10-CM

## 2023-06-06 PROCEDURE — 3008F BODY MASS INDEX DOCD: CPT | Mod: CPTII,S$GLB,, | Performed by: OBSTETRICS & GYNECOLOGY

## 2023-06-06 PROCEDURE — 3078F PR MOST RECENT DIASTOLIC BLOOD PRESSURE < 80 MM HG: ICD-10-PCS | Mod: CPTII,S$GLB,, | Performed by: OBSTETRICS & GYNECOLOGY

## 2023-06-06 PROCEDURE — 76819 PR US, OB, FETAL BIOPHYSICAL, W/O NST: ICD-10-PCS | Mod: S$GLB,,, | Performed by: OBSTETRICS & GYNECOLOGY

## 2023-06-06 PROCEDURE — 76816 OB US FOLLOW-UP PER FETUS: CPT | Mod: S$GLB,,, | Performed by: OBSTETRICS & GYNECOLOGY

## 2023-06-06 PROCEDURE — 3074F SYST BP LT 130 MM HG: CPT | Mod: CPTII,S$GLB,, | Performed by: OBSTETRICS & GYNECOLOGY

## 2023-06-06 PROCEDURE — 3074F PR MOST RECENT SYSTOLIC BLOOD PRESSURE < 130 MM HG: ICD-10-PCS | Mod: CPTII,S$GLB,, | Performed by: OBSTETRICS & GYNECOLOGY

## 2023-06-06 PROCEDURE — 99213 OFFICE O/P EST LOW 20 MIN: CPT | Mod: 25,TH,S$GLB, | Performed by: OBSTETRICS & GYNECOLOGY

## 2023-06-06 PROCEDURE — 3008F PR BODY MASS INDEX (BMI) DOCUMENTED: ICD-10-PCS | Mod: CPTII,S$GLB,, | Performed by: OBSTETRICS & GYNECOLOGY

## 2023-06-06 PROCEDURE — 76819 FETAL BIOPHYS PROFIL W/O NST: CPT | Mod: S$GLB,,, | Performed by: OBSTETRICS & GYNECOLOGY

## 2023-06-06 PROCEDURE — 3078F DIAST BP <80 MM HG: CPT | Mod: CPTII,S$GLB,, | Performed by: OBSTETRICS & GYNECOLOGY

## 2023-06-06 PROCEDURE — 1159F PR MEDICATION LIST DOCUMENTED IN MEDICAL RECORD: ICD-10-PCS | Mod: CPTII,S$GLB,, | Performed by: OBSTETRICS & GYNECOLOGY

## 2023-06-06 PROCEDURE — 1159F MED LIST DOCD IN RCRD: CPT | Mod: CPTII,S$GLB,, | Performed by: OBSTETRICS & GYNECOLOGY

## 2023-06-06 PROCEDURE — 76816 PR  US,PREGNANT UTERUS,F/U,TRANSABD APP: ICD-10-PCS | Mod: S$GLB,,, | Performed by: OBSTETRICS & GYNECOLOGY

## 2023-06-06 PROCEDURE — 99213 PR OFFICE/OUTPT VISIT, EST, LEVL III, 20-29 MIN: ICD-10-PCS | Mod: 25,TH,S$GLB, | Performed by: OBSTETRICS & GYNECOLOGY

## 2023-06-06 RX ORDER — BLOOD-GLUCOSE METER
EACH MISCELLANEOUS
Status: ON HOLD | COMMUNITY
Start: 2022-12-20 | End: 2023-06-18 | Stop reason: HOSPADM

## 2023-06-06 RX ORDER — LANCETS 33 GAUGE
EACH MISCELLANEOUS
Status: ON HOLD | COMMUNITY
Start: 2023-02-07 | End: 2023-06-18 | Stop reason: HOSPADM

## 2023-06-06 NOTE — ASSESSMENT & PLAN NOTE
Patient has medications delivered from meds to beds locked in pt's med cabinet Please see prior documentation for specific counseling.      Recommendations:  -Fetal growth ultrasounds in third trimester, every 4-6 weeks--normal growth today  -Antepartum surveillance is not indicated in absence of FGR diagnosis or other maternal-fetal indications for prenatal testing  -Monitor gestational weight gain, encourage prenatal vitamin

## 2023-06-06 NOTE — ASSESSMENT & PLAN NOTE
She previously lived in [facility] and received primary OB and MFM care there.  She recently relocated to [place]. She was diagnosed with diabetes at 10 weeks EGA.  All baseline testing was completed by her previous providers.    She is currently taking Metformin 500 mg with breakfast.  She presents with a sugar log today which demonstrates good glycemic control.    Recommendations:  Baseline evaluation (to be ordered by primary OB provider):   24 hour urine protein or urine P/C ratio, CBC, CMP (completed)   Maternal EKG; echocardiogram if BMI > 30 or EKG is abnormal (Initially completed EKG in [place] in early pregnancy. An arrhythmia was identified. Recommend repeat EKG assessment. If abnormal, refer to cardiology)   Maternal ophthalmic exam (if hasn't been performed in last year) and podiatry exam   Hemoglobin A1C every trimester (5.6 in 1st trimester)   Diabetic education referral and counseling re: goal blood sugars (< 95 mg/dl for fasting, < 120 mg/dl for 2 hour postprandial)  Medications:    Low dose aspirin 81 mg daily at 12-16 weeks for preeclampsia risk reduction   1 mg folic acid daily   Regimen: Metformin 500mg QAM  Ultrasounds with MFM:   Serial growth ultrasounds q 4-6 weeks at 26-28 weeks--done today    A fetal echocardiogram is recommended at 22-24 weeks with pediatric cardiology (completed in [place] and normal)    Initiate  surveillance at 32 weeks:    Weekly BPP or NST + AFV if good control.    If control is poor, twice weekly  fetal surveillance is recommended with BPP alternating with NST.   Delivery timing:   38 0/7 to 38 and 6/7 weeks if under good control without comorbidities   37 0/7 to 37 and 67 weeks if longstanding diabetes or poorly controlled, polyhydramnios, EFW>90th percentile, or BMI >= 40   36 0/7 to 37 and 6/7 weeks if vascular complications, prior stillbirth or other complicating conditions.  Recommend consideration of earlier  delivery if IUGR, HTN, or other complications  Recommend offering  for delivery is EFW is 4500g or more near the time of delivery

## 2023-06-06 NOTE — PROGRESS NOTES
Maternal Fetal Medicine follow up consult    SUBJECTIVE:     Shawna Oseguera is a 28 y.o.  female with IUP at 35w6d who is seen in follow up consultation by MFM.  Pregnancy complications include:   Problem   Gestational Diabetes Mellitus (Gdm) in Third Trimester Controlled On Oral Hypoglycemic Drug   2. History of prior pregnancy with IUGR    3. History of gestational hypertension   4. BMI affecting pregnancy in third trimester       Previous notes reviewed.   No changes to medical, surgical, family, social, or obstetric history.    Interval history since last MFM visit: no changes    Medications:  Current Outpatient Medications   Medication Instructions    aspirin (ECOTRIN) 81 mg, Oral, Daily    blood sugar diagnostic Strp 1 each, Misc.(Non-Drug; Combo Route), 4 times daily    metFORMIN (GLUCOPHAGE) 500 mg, Oral, With breakfast, W/ DIABETIC SNACK    prenatal vit/iron fum/folic ac (PRENATAL 1+1 ORAL) Oral    TRUE METRIX GLUCOSE METER Misc USE AS DIRECTED TO TEST BLOOD SUGAR WHILE PREGNANT    TRUEPLUS LANCETS 33 gauge Misc USE 1 TO CHECK GLUCOSE 4 TIMES DAILY FASTING THE MORNING, 2 HOUR AFTER BREAKFAST, LUNCH AND DINNER       Care team members:  Dr Matthews - Primary OB       OBJECTIVE:   /60 (BP Location: Right arm)   Pulse 82   Wt 119.2 kg (262 lb 10.9 oz)   BMI 48.04 kg/m²     Ultrasound performed. See viewpoint for full ultrasound report.    ASSESSMENT/PLAN:     28 y.o.  female with IUP at 35w6d    Gestational diabetes mellitus (GDM) in third trimester controlled on oral hypoglycemic drug  She previously lived in [facility] and received primary OB and MFM care there.  She recently relocated to [place]. She was diagnosed with diabetes at 10 weeks EGA.  All baseline testing was completed by her previous providers.    She is currently taking Metformin 500 mg with breakfast.  She presents with a sugar log today which demonstrates good glycemic  control.    Recommendations:  Baseline evaluation (to be ordered by primary OB provider):  24 hour urine protein or urine P/C ratio, CBC, CMP (completed)  Maternal EKG; echocardiogram if BMI > 30 or EKG is abnormal (Initially completed EKG in [place] in early pregnancy. An arrhythmia was identified. Recommend repeat EKG assessment. If abnormal, refer to cardiology)  Maternal ophthalmic exam (if hasn't been performed in last year) and podiatry exam  Hemoglobin A1C every trimester (5.6 in 1st trimester)  Diabetic education referral and counseling re: goal blood sugars (< 95 mg/dl for fasting, < 120 mg/dl for 2 hour postprandial)  Medications:   Low dose aspirin 81 mg daily at 12-16 weeks for preeclampsia risk reduction  1 mg folic acid daily  Regimen: Metformin 500mg QAM  Ultrasounds with MFM:  Serial growth ultrasounds q 4-6 weeks at 26-28 weeks--done today    A fetal echocardiogram is recommended at 22-24 weeks with pediatric cardiology (completed in [place] and normal)    Initiate  surveillance at 32 weeks:   Weekly BPP or NST + AFV if good control.   If control is poor, twice weekly  fetal surveillance is recommended with BPP alternating with NST.   Delivery timin 0/7 to 38 and 6/7 weeks if under good control without comorbidities  37 0/7 to 37 and 67 weeks if longstanding diabetes or poorly controlled, polyhydramnios, EFW>90th percentile, or BMI >= 40  36 0/7 to 37 and 6/7 weeks if vascular complications, prior stillbirth or other complicating conditions.  Recommend consideration of earlier delivery if IUGR, HTN, or other complications  Recommend offering  for delivery is EFW is 4500g or more near the time of delivery    2. History of prior pregnancy with IUGR   Please see prior documentation for specific counseling.      Recommendations:  -Fetal growth ultrasounds in third trimester, every 4-6 weeks--normal growth today  -Antepartum surveillance is not indicated in  absence of FGR diagnosis or other maternal-fetal indications for prenatal testing  -Monitor gestational weight gain, encourage prenatal vitamin      3. History of gestational hypertension  /60  No sx    Recommendations:  Aspirin 81 mg daily at 12-16 weeks for preeclampsia risk reduction  Obtain baseline preeclampsia studies: (CMP, CBC, 24 hour urine protein or urine protein/creatinine ratio-completed)  Close surveillance for signs/symptoms of preeclampsia in second/third trimester and postpartum period      4. BMI affecting pregnancy in third trimester  Please see prior documentation for specific counseling.      BMI 48 @ 35w6d    Recommendations:  TWG goal is 11-20 lbs  Continue low dose aspirin 81 mg daily for preeclampsia risk reduction  Lovenox 40 mg BID for VTE prophylaxis while admitted to the hospital (antepartum or postpartum) if BMI >= 40.   Encourage breastfeeding  Postpartum lifestyle modifications & weight loss        Due to current gestational age and delivery timing recommendations, no further follow-up appointments have been made at this time.  We would be happy to see her again should any questions, concerns, or issues arise.    Antwon Quan Jr., MD  Maternal Fetal Medicine

## 2023-06-06 NOTE — ASSESSMENT & PLAN NOTE
Please see prior documentation for specific counseling.      BMI 48 @ 35w6d    Recommendations:   TWG goal is 11-20 lbs   Continue low dose aspirin 81 mg daily for preeclampsia risk reduction   Lovenox 40 mg BID for VTE prophylaxis while admitted to the hospital (antepartum or postpartum) if BMI >= 40.    Encourage breastfeeding   Postpartum lifestyle modifications & weight loss

## 2023-06-06 NOTE — ASSESSMENT & PLAN NOTE
/60  No sx    Recommendations:   Aspirin 81 mg daily at 12-16 weeks for preeclampsia risk reduction   Obtain baseline preeclampsia studies: (CMP, CBC, 24 hour urine protein or urine protein/creatinine ratio-completed)   Close surveillance for signs/symptoms of preeclampsia in second/third trimester and postpartum period

## 2023-06-07 ENCOUNTER — ROUTINE PRENATAL (OUTPATIENT)
Dept: OBSTETRICS AND GYNECOLOGY | Facility: CLINIC | Age: 28
End: 2023-06-07
Payer: MEDICAID

## 2023-06-07 VITALS
HEART RATE: 72 BPM | SYSTOLIC BLOOD PRESSURE: 129 MMHG | BODY MASS INDEX: 48.1 KG/M2 | WEIGHT: 263 LBS | DIASTOLIC BLOOD PRESSURE: 66 MMHG

## 2023-06-07 DIAGNOSIS — O99.213 OBESITY AFFECTING PREGNANCY IN THIRD TRIMESTER: ICD-10-CM

## 2023-06-07 DIAGNOSIS — Z30.09 UNWANTED FERTILITY: ICD-10-CM

## 2023-06-07 DIAGNOSIS — Z98.891 HISTORY OF CESAREAN SECTION: ICD-10-CM

## 2023-06-07 DIAGNOSIS — O24.415 GESTATIONAL DIABETES MELLITUS (GDM) IN THIRD TRIMESTER CONTROLLED ON ORAL HYPOGLYCEMIC DRUG: ICD-10-CM

## 2023-06-07 DIAGNOSIS — O09.299 PREGNANCY WITH POOR OBSTETRIC HISTORY: ICD-10-CM

## 2023-06-07 DIAGNOSIS — Z3A.36 36 WEEKS GESTATION OF PREGNANCY: Primary | ICD-10-CM

## 2023-06-07 DIAGNOSIS — Z36.85 ANTENATAL SCREENING FOR STREPTOCOCCUS B: ICD-10-CM

## 2023-06-07 PROCEDURE — 99213 OFFICE O/P EST LOW 20 MIN: CPT | Mod: PBBFAC,TH | Performed by: OBSTETRICS & GYNECOLOGY

## 2023-06-07 PROCEDURE — 99214 PR OFFICE/OUTPT VISIT, EST, LEVL IV, 30-39 MIN: ICD-10-PCS | Mod: TH,S$PBB,, | Performed by: OBSTETRICS & GYNECOLOGY

## 2023-06-07 PROCEDURE — 99999 PR PBB SHADOW E&M-EST. PATIENT-LVL III: ICD-10-PCS | Mod: PBBFAC,,, | Performed by: OBSTETRICS & GYNECOLOGY

## 2023-06-07 PROCEDURE — 99999 PR PBB SHADOW E&M-EST. PATIENT-LVL III: CPT | Mod: PBBFAC,,, | Performed by: OBSTETRICS & GYNECOLOGY

## 2023-06-07 PROCEDURE — 99214 OFFICE O/P EST MOD 30 MIN: CPT | Mod: TH,S$PBB,, | Performed by: OBSTETRICS & GYNECOLOGY

## 2023-06-07 PROCEDURE — 87081 CULTURE SCREEN ONLY: CPT | Performed by: OBSTETRICS & GYNECOLOGY

## 2023-06-07 NOTE — PROGRESS NOTES
Patient states headache for 2 days-relieved with Tylenol.  Reviewed notes from [facility] visit yesterday and now recommends delivery during 37th week.  Repeat  with tubal ligation scheduled for .  Procedure explained including all risks, benefits, alternatives and patient desires to proceed.  All questions answered and consent obtained.    BPP today ; GBS collected.  Labor precautions given.

## 2023-06-10 LAB — BACTERIA SPEC AEROBE CULT: NORMAL

## 2023-06-12 ENCOUNTER — ANESTHESIA EVENT (OUTPATIENT)
Dept: SURGERY | Facility: HOSPITAL | Age: 28
End: 2023-06-12
Payer: MEDICAID

## 2023-06-12 DIAGNOSIS — Z30.09 UNWANTED FERTILITY: ICD-10-CM

## 2023-06-12 DIAGNOSIS — O24.415 GESTATIONAL DIABETES MELLITUS (GDM) IN THIRD TRIMESTER CONTROLLED ON ORAL HYPOGLYCEMIC DRUG: Primary | ICD-10-CM

## 2023-06-12 DIAGNOSIS — Z98.891 HISTORY OF CESAREAN SECTION: ICD-10-CM

## 2023-06-12 DIAGNOSIS — O99.213 OBESITY AFFECTING PREGNANCY IN THIRD TRIMESTER: ICD-10-CM

## 2023-06-12 RX ORDER — DOCUSATE SODIUM 100 MG/1
200 CAPSULE, LIQUID FILLED ORAL 2 TIMES DAILY
Status: CANCELLED | OUTPATIENT
Start: 2023-06-12

## 2023-06-12 RX ORDER — ONDANSETRON 4 MG/1
8 TABLET, ORALLY DISINTEGRATING ORAL EVERY 8 HOURS PRN
Status: CANCELLED | OUTPATIENT
Start: 2023-06-12

## 2023-06-12 RX ORDER — ONDANSETRON 2 MG/ML
4 INJECTION INTRAMUSCULAR; INTRAVENOUS EVERY 6 HOURS PRN
Status: CANCELLED | OUTPATIENT
Start: 2023-06-12

## 2023-06-12 RX ORDER — SODIUM CHLORIDE, SODIUM LACTATE, POTASSIUM CHLORIDE, CALCIUM CHLORIDE 600; 310; 30; 20 MG/100ML; MG/100ML; MG/100ML; MG/100ML
INJECTION, SOLUTION INTRAVENOUS CONTINUOUS
Status: CANCELLED | OUTPATIENT
Start: 2023-06-12

## 2023-06-12 RX ORDER — AMOXICILLIN 250 MG
1 CAPSULE ORAL NIGHTLY PRN
Status: CANCELLED | OUTPATIENT
Start: 2023-06-12

## 2023-06-12 RX ORDER — METHYLERGONOVINE MALEATE 0.2 MG/ML
200 INJECTION INTRAVENOUS ONCE AS NEEDED
Status: CANCELLED | OUTPATIENT
Start: 2023-06-12 | End: 2034-11-08

## 2023-06-12 RX ORDER — SIMETHICONE 80 MG
1 TABLET,CHEWABLE ORAL EVERY 6 HOURS PRN
Status: CANCELLED | OUTPATIENT
Start: 2023-06-12

## 2023-06-12 RX ORDER — OXYCODONE HYDROCHLORIDE 5 MG/1
5 TABLET ORAL EVERY 4 HOURS PRN
Status: CANCELLED | OUTPATIENT
Start: 2023-06-12

## 2023-06-12 RX ORDER — ENOXAPARIN SODIUM 300 MG/3ML
40 INJECTION INTRAVENOUS; SUBCUTANEOUS EVERY 24 HOURS
Status: CANCELLED | OUTPATIENT
Start: 2023-06-12

## 2023-06-12 RX ORDER — HYDROCORTISONE 25 MG/G
CREAM TOPICAL 3 TIMES DAILY PRN
Status: CANCELLED | OUTPATIENT
Start: 2023-06-12

## 2023-06-12 RX ORDER — DIPHENHYDRAMINE HCL 25 MG
25 CAPSULE ORAL EVERY 6 HOURS PRN
Status: CANCELLED | OUTPATIENT
Start: 2023-06-12

## 2023-06-12 RX ORDER — MISOPROSTOL 100 UG/1
800 TABLET ORAL ONCE AS NEEDED
Status: CANCELLED | OUTPATIENT
Start: 2023-06-12 | End: 2034-11-08

## 2023-06-12 RX ORDER — SODIUM CITRATE AND CITRIC ACID MONOHYDRATE 334; 500 MG/5ML; MG/5ML
30 SOLUTION ORAL
Status: CANCELLED | OUTPATIENT
Start: 2023-06-12

## 2023-06-12 RX ORDER — NALBUPHINE HYDROCHLORIDE 10 MG/ML
5 INJECTION, SOLUTION INTRAMUSCULAR; INTRAVENOUS; SUBCUTANEOUS ONCE AS NEEDED
Status: CANCELLED | OUTPATIENT
Start: 2023-06-12 | End: 2034-11-08

## 2023-06-12 RX ORDER — KETOROLAC TROMETHAMINE 30 MG/ML
30 INJECTION, SOLUTION INTRAMUSCULAR; INTRAVENOUS
Status: CANCELLED | OUTPATIENT
Start: 2023-06-12 | End: 2023-06-13

## 2023-06-12 RX ORDER — OXYCODONE HYDROCHLORIDE 5 MG/1
10 TABLET ORAL EVERY 4 HOURS PRN
Status: CANCELLED | OUTPATIENT
Start: 2023-06-12

## 2023-06-12 RX ORDER — PRENATAL WITH FERROUS FUM AND FOLIC ACID 3080; 920; 120; 400; 22; 1.84; 3; 20; 10; 1; 12; 200; 27; 25; 2 [IU]/1; [IU]/1; MG/1; [IU]/1; MG/1; MG/1; MG/1; MG/1; MG/1; MG/1; UG/1; MG/1; MG/1; MG/1; MG/1
1 TABLET ORAL DAILY
Status: CANCELLED | OUTPATIENT
Start: 2023-06-12

## 2023-06-12 RX ORDER — ACETAMINOPHEN 325 MG/1
650 TABLET ORAL
Status: CANCELLED | OUTPATIENT
Start: 2023-06-12

## 2023-06-12 RX ORDER — ACETAMINOPHEN 500 MG
1000 TABLET ORAL
Status: CANCELLED | OUTPATIENT
Start: 2023-06-12

## 2023-06-12 RX ORDER — PROCHLORPERAZINE EDISYLATE 5 MG/ML
5 INJECTION INTRAMUSCULAR; INTRAVENOUS EVERY 6 HOURS PRN
Status: CANCELLED | OUTPATIENT
Start: 2023-06-12

## 2023-06-12 RX ORDER — DIPHENHYDRAMINE HYDROCHLORIDE 50 MG/ML
12.5 INJECTION INTRAMUSCULAR; INTRAVENOUS
Status: CANCELLED | OUTPATIENT
Start: 2023-06-12

## 2023-06-12 RX ORDER — FAMOTIDINE 10 MG/ML
20 INJECTION INTRAVENOUS
Status: CANCELLED | OUTPATIENT
Start: 2023-06-12

## 2023-06-12 RX ORDER — OXYTOCIN/RINGER'S LACTATE 30/500 ML
95 PLASTIC BAG, INJECTION (ML) INTRAVENOUS CONTINUOUS
Status: CANCELLED | OUTPATIENT
Start: 2023-06-12

## 2023-06-12 RX ORDER — IBUPROFEN 200 MG
800 TABLET ORAL
Status: CANCELLED | OUTPATIENT
Start: 2023-06-13

## 2023-06-12 RX ORDER — CARBOPROST TROMETHAMINE 250 UG/ML
250 INJECTION, SOLUTION INTRAMUSCULAR
Status: CANCELLED | OUTPATIENT
Start: 2023-06-12

## 2023-06-15 ENCOUNTER — PROCEDURE VISIT (OUTPATIENT)
Dept: OBSTETRICS AND GYNECOLOGY | Facility: CLINIC | Age: 28
End: 2023-06-15
Payer: MEDICAID

## 2023-06-15 ENCOUNTER — ROUTINE PRENATAL (OUTPATIENT)
Dept: OBSTETRICS AND GYNECOLOGY | Facility: CLINIC | Age: 28
End: 2023-06-15
Payer: MEDICAID

## 2023-06-15 VITALS
BODY MASS INDEX: 48.29 KG/M2 | WEIGHT: 264 LBS | HEART RATE: 89 BPM | DIASTOLIC BLOOD PRESSURE: 74 MMHG | SYSTOLIC BLOOD PRESSURE: 130 MMHG

## 2023-06-15 DIAGNOSIS — Z98.891 HISTORY OF CESAREAN SECTION: ICD-10-CM

## 2023-06-15 DIAGNOSIS — Z3A.37 37 WEEKS GESTATION OF PREGNANCY: Primary | ICD-10-CM

## 2023-06-15 DIAGNOSIS — O24.415 GESTATIONAL DIABETES MELLITUS (GDM) IN THIRD TRIMESTER CONTROLLED ON ORAL HYPOGLYCEMIC DRUG: ICD-10-CM

## 2023-06-15 DIAGNOSIS — O99.213 OBESITY AFFECTING PREGNANCY IN THIRD TRIMESTER: ICD-10-CM

## 2023-06-15 DIAGNOSIS — Z92.89 HISTORY OF FETAL BIOPHYSICAL PROFILE: Primary | ICD-10-CM

## 2023-06-15 DIAGNOSIS — Z30.09 UNWANTED FERTILITY: ICD-10-CM

## 2023-06-15 DIAGNOSIS — O09.299 PREGNANCY WITH POOR OBSTETRIC HISTORY: ICD-10-CM

## 2023-06-15 DIAGNOSIS — E66.01 CLASS 3 SEVERE OBESITY WITH BODY MASS INDEX (BMI) OF 45.0 TO 49.9 IN ADULT, UNSPECIFIED OBESITY TYPE, UNSPECIFIED WHETHER SERIOUS COMORBIDITY PRESENT: ICD-10-CM

## 2023-06-15 PROCEDURE — 76819 US OB/GYN EXTENDED PROCEDURE (VIEWPOINT): ICD-10-PCS | Mod: 26,S$PBB,, | Performed by: OBSTETRICS & GYNECOLOGY

## 2023-06-15 PROCEDURE — 99999 PR PBB SHADOW E&M-EST. PATIENT-LVL III: ICD-10-PCS | Mod: PBBFAC,,, | Performed by: OBSTETRICS & GYNECOLOGY

## 2023-06-15 PROCEDURE — 99213 OFFICE O/P EST LOW 20 MIN: CPT | Mod: PBBFAC,TH,25 | Performed by: OBSTETRICS & GYNECOLOGY

## 2023-06-15 PROCEDURE — 99999 PR PBB SHADOW E&M-EST. PATIENT-LVL III: CPT | Mod: PBBFAC,,, | Performed by: OBSTETRICS & GYNECOLOGY

## 2023-06-15 PROCEDURE — 76819 FETAL BIOPHYS PROFIL W/O NST: CPT | Mod: PBBFAC | Performed by: OBSTETRICS & GYNECOLOGY

## 2023-06-15 PROCEDURE — 99213 PR OFFICE/OUTPT VISIT, EST, LEVL III, 20-29 MIN: ICD-10-PCS | Mod: 25,TH,S$PBB, | Performed by: OBSTETRICS & GYNECOLOGY

## 2023-06-15 PROCEDURE — 99213 OFFICE O/P EST LOW 20 MIN: CPT | Mod: 25,TH,S$PBB, | Performed by: OBSTETRICS & GYNECOLOGY

## 2023-06-15 NOTE — PROGRESS NOTES
Patient is slated for repeat  with BTL in the morning at [place].  She states she is not been notified as to what time to arrive.  I explained that if she does not hear from them by noon then to call hospital.  Patient reports no complaints today and states she is feeling well.  BPP  in vertex presentation.    Patient enquiring about appointments to see someone about gastric sleeve.  I placed referral to General surgery in regard to gastric sleeve in computer.  All questions regarding  and upcoming hospital stay answered.  Precautions given.  Return to clinic in 2 weeks for postop visit

## 2023-06-16 ENCOUNTER — HOSPITAL ENCOUNTER (INPATIENT)
Facility: HOSPITAL | Age: 28
LOS: 2 days | Discharge: HOME | End: 2023-06-18
Attending: OBSTETRICS & GYNECOLOGY | Admitting: OBSTETRICS & GYNECOLOGY
Payer: MEDICAID

## 2023-06-16 ENCOUNTER — ANESTHESIA (OUTPATIENT)
Dept: SURGERY | Facility: HOSPITAL | Age: 28
End: 2023-06-16
Payer: MEDICAID

## 2023-06-16 DIAGNOSIS — O99.213 OBESITY AFFECTING PREGNANCY IN THIRD TRIMESTER: ICD-10-CM

## 2023-06-16 DIAGNOSIS — O24.415 GESTATIONAL DIABETES MELLITUS (GDM) IN THIRD TRIMESTER CONTROLLED ON ORAL HYPOGLYCEMIC DRUG: ICD-10-CM

## 2023-06-16 DIAGNOSIS — Z98.891 S/P CESAREAN SECTION: Primary | ICD-10-CM

## 2023-06-16 DIAGNOSIS — Z30.09 UNWANTED FERTILITY: ICD-10-CM

## 2023-06-16 DIAGNOSIS — Z98.891 HISTORY OF CESAREAN SECTION: ICD-10-CM

## 2023-06-16 LAB
ABO + RH BLD: NORMAL
BASOPHILS # BLD AUTO: 0.04 K/UL (ref 0–0.2)
BASOPHILS NFR BLD: 0.4 % (ref 0–1.9)
BLD GP AB SCN CELLS X3 SERPL QL: NORMAL
DIFFERENTIAL METHOD: ABNORMAL
EOSINOPHIL # BLD AUTO: 0.1 K/UL (ref 0–0.5)
EOSINOPHIL NFR BLD: 0.8 % (ref 0–8)
ERYTHROCYTE [DISTWIDTH] IN BLOOD BY AUTOMATED COUNT: 13.5 % (ref 11.5–14.5)
HCT VFR BLD AUTO: 38.4 % (ref 37–48.5)
HGB BLD-MCNC: 13 G/DL (ref 12–16)
IMM GRANULOCYTES # BLD AUTO: 0.06 K/UL (ref 0–0.04)
IMM GRANULOCYTES NFR BLD AUTO: 0.6 % (ref 0–0.5)
LYMPHOCYTES # BLD AUTO: 2.3 K/UL (ref 1–4.8)
LYMPHOCYTES NFR BLD: 23.6 % (ref 18–48)
MCH RBC QN AUTO: 29.9 PG (ref 27–31)
MCHC RBC AUTO-ENTMCNC: 33.9 G/DL (ref 32–36)
MCV RBC AUTO: 88 FL (ref 82–98)
MONOCYTES # BLD AUTO: 0.7 K/UL (ref 0.3–1)
MONOCYTES NFR BLD: 7.7 % (ref 4–15)
NEUTROPHILS # BLD AUTO: 6.4 K/UL (ref 1.8–7.7)
NEUTROPHILS NFR BLD: 66.9 % (ref 38–73)
NRBC BLD-RTO: 0 /100 WBC
PLATELET # BLD AUTO: 205 K/UL (ref 150–450)
PMV BLD AUTO: 11.6 FL (ref 9.2–12.9)
RBC # BLD AUTO: 4.35 M/UL (ref 4–5.4)
SPECIMEN OUTDATE: NORMAL
WBC # BLD AUTO: 9.59 K/UL (ref 3.9–12.7)

## 2023-06-16 PROCEDURE — 58611 PR LIGATION,FALLOPIAN TUBE W/C-SECTION: ICD-10-PCS | Mod: ,,, | Performed by: OBSTETRICS & GYNECOLOGY

## 2023-06-16 PROCEDURE — 59514AH PRA REAN DELIVERY ONLY: Mod: QZ | Performed by: NURSE ANESTHETIST, CERTIFIED REGISTERED

## 2023-06-16 PROCEDURE — 25000003 PHARM REV CODE 250: Performed by: OBSTETRICS & GYNECOLOGY

## 2023-06-16 PROCEDURE — 63600175 PHARM REV CODE 636 W HCPCS: Performed by: OBSTETRICS & GYNECOLOGY

## 2023-06-16 PROCEDURE — 36415 COLL VENOUS BLD VENIPUNCTURE: CPT | Performed by: OBSTETRICS & GYNECOLOGY

## 2023-06-16 PROCEDURE — 37000008 HC ANESTHESIA 1ST 15 MINUTES: Performed by: OBSTETRICS & GYNECOLOGY

## 2023-06-16 PROCEDURE — 01961 ANES CESAREAN DELIVERY ONLY: CPT | Mod: QZ | Performed by: NURSE ANESTHETIST, CERTIFIED REGISTERED

## 2023-06-16 PROCEDURE — 11000001 HC ACUTE MED/SURG PRIVATE ROOM

## 2023-06-16 PROCEDURE — 88302 PR  SURG PATH,LEVEL II: ICD-10-PCS | Mod: 26,,, | Performed by: PATHOLOGY

## 2023-06-16 PROCEDURE — 85025 COMPLETE CBC W/AUTO DIFF WBC: CPT | Performed by: OBSTETRICS & GYNECOLOGY

## 2023-06-16 PROCEDURE — 27000492 HC SLEEVE, SCD T/L

## 2023-06-16 PROCEDURE — 63600175 PHARM REV CODE 636 W HCPCS: Performed by: NURSE ANESTHETIST, CERTIFIED REGISTERED

## 2023-06-16 PROCEDURE — 88302 TISSUE EXAM BY PATHOLOGIST: CPT | Mod: 26,,, | Performed by: PATHOLOGY

## 2023-06-16 PROCEDURE — 99223 1ST HOSP IP/OBS HIGH 75: CPT | Mod: ,,, | Performed by: OBSTETRICS & GYNECOLOGY

## 2023-06-16 PROCEDURE — 36000709 HC OR TIME LEV III EA ADD 15 MIN: Mod: SZN

## 2023-06-16 PROCEDURE — 37000009 HC ANESTHESIA EA ADD 15 MINS: Mod: SZN

## 2023-06-16 PROCEDURE — 36000709 HC OR TIME LEV III EA ADD 15 MIN: Performed by: OBSTETRICS & GYNECOLOGY

## 2023-06-16 PROCEDURE — 36000708 HC OR TIME LEV III 1ST 15 MIN: Performed by: OBSTETRICS & GYNECOLOGY

## 2023-06-16 PROCEDURE — 51702 INSERT TEMP BLADDER CATH: CPT

## 2023-06-16 PROCEDURE — 58611 LIGATE OVIDUCT(S) ADD-ON: CPT | Mod: ,,, | Performed by: OBSTETRICS & GYNECOLOGY

## 2023-06-16 PROCEDURE — 99223 PR INITIAL HOSPITAL CARE,LEVL III: ICD-10-PCS | Mod: ,,, | Performed by: OBSTETRICS & GYNECOLOGY

## 2023-06-16 PROCEDURE — 59514 PR CESAREAN DELIVERY ONLY: ICD-10-PCS | Mod: AT,,, | Performed by: OBSTETRICS & GYNECOLOGY

## 2023-06-16 PROCEDURE — 37000009 HC ANESTHESIA EA ADD 15 MINS: Performed by: OBSTETRICS & GYNECOLOGY

## 2023-06-16 PROCEDURE — 59514 CESAREAN DELIVERY ONLY: CPT | Mod: AT,,, | Performed by: OBSTETRICS & GYNECOLOGY

## 2023-06-16 PROCEDURE — 86900 BLOOD TYPING SEROLOGIC ABO: CPT | Performed by: OBSTETRICS & GYNECOLOGY

## 2023-06-16 PROCEDURE — 88302 TISSUE EXAM BY PATHOLOGIST: CPT | Mod: 59,SZN | Performed by: PATHOLOGY

## 2023-06-16 PROCEDURE — 25000003 PHARM REV CODE 250: Performed by: NURSE ANESTHETIST, CERTIFIED REGISTERED

## 2023-06-16 RX ORDER — BUPIVACAINE HYDROCHLORIDE 7.5 MG/ML
INJECTION, SOLUTION INTRASPINAL
Status: DISCONTINUED | OUTPATIENT
Start: 2023-06-16 | End: 2023-06-16

## 2023-06-16 RX ORDER — HYDROCORTISONE 25 MG/G
CREAM TOPICAL 3 TIMES DAILY PRN
Status: DISCONTINUED | OUTPATIENT
Start: 2023-06-16 | End: 2023-06-18 | Stop reason: HOSPADM

## 2023-06-16 RX ORDER — FENTANYL CITRATE 50 UG/ML
INJECTION, SOLUTION INTRAMUSCULAR; INTRAVENOUS
Status: DISCONTINUED | OUTPATIENT
Start: 2023-06-16 | End: 2023-06-16

## 2023-06-16 RX ORDER — SODIUM CHLORIDE, SODIUM LACTATE, POTASSIUM CHLORIDE, CALCIUM CHLORIDE 600; 310; 30; 20 MG/100ML; MG/100ML; MG/100ML; MG/100ML
INJECTION, SOLUTION INTRAVENOUS CONTINUOUS PRN
Status: DISCONTINUED | OUTPATIENT
Start: 2023-06-16 | End: 2023-06-16

## 2023-06-16 RX ORDER — DOCUSATE SODIUM 100 MG/1
200 CAPSULE, LIQUID FILLED ORAL 2 TIMES DAILY
Status: DISCONTINUED | OUTPATIENT
Start: 2023-06-16 | End: 2023-06-18 | Stop reason: HOSPADM

## 2023-06-16 RX ORDER — ONDANSETRON 2 MG/ML
4 INJECTION INTRAMUSCULAR; INTRAVENOUS EVERY 6 HOURS PRN
Status: DISCONTINUED | OUTPATIENT
Start: 2023-06-16 | End: 2023-06-18 | Stop reason: HOSPADM

## 2023-06-16 RX ORDER — SODIUM CHLORIDE, SODIUM LACTATE, POTASSIUM CHLORIDE, CALCIUM CHLORIDE 600; 310; 30; 20 MG/100ML; MG/100ML; MG/100ML; MG/100ML
INJECTION, SOLUTION INTRAVENOUS CONTINUOUS
Status: DISCONTINUED | OUTPATIENT
Start: 2023-06-16 | End: 2023-06-18 | Stop reason: HOSPADM

## 2023-06-16 RX ORDER — SODIUM CITRATE AND CITRIC ACID MONOHYDRATE 334; 500 MG/5ML; MG/5ML
30 SOLUTION ORAL
Status: COMPLETED | OUTPATIENT
Start: 2023-06-16 | End: 2023-06-16

## 2023-06-16 RX ORDER — GLUCAGON 1 MG
1 KIT INJECTION
Status: DISCONTINUED | OUTPATIENT
Start: 2023-06-16 | End: 2023-06-16

## 2023-06-16 RX ORDER — ENOXAPARIN SODIUM 100 MG/ML
40 INJECTION SUBCUTANEOUS EVERY 12 HOURS
Status: DISCONTINUED | OUTPATIENT
Start: 2023-06-16 | End: 2023-06-18 | Stop reason: HOSPADM

## 2023-06-16 RX ORDER — DEXTROSE 40 %
15 GEL (GRAM) ORAL
Status: DISCONTINUED | OUTPATIENT
Start: 2023-06-16 | End: 2023-06-16

## 2023-06-16 RX ORDER — OXYCODONE HYDROCHLORIDE 5 MG/1
10 TABLET ORAL EVERY 4 HOURS PRN
Status: DISCONTINUED | OUTPATIENT
Start: 2023-06-16 | End: 2023-06-18 | Stop reason: HOSPADM

## 2023-06-16 RX ORDER — PRENATAL WITH FERROUS FUM AND FOLIC ACID 3080; 920; 120; 400; 22; 1.84; 3; 20; 10; 1; 12; 200; 27; 25; 2 [IU]/1; [IU]/1; MG/1; [IU]/1; MG/1; MG/1; MG/1; MG/1; MG/1; MG/1; UG/1; MG/1; MG/1; MG/1; MG/1
1 TABLET ORAL DAILY
Status: DISCONTINUED | OUTPATIENT
Start: 2023-06-16 | End: 2023-06-18 | Stop reason: HOSPADM

## 2023-06-16 RX ORDER — IBUPROFEN 800 MG/1
800 TABLET ORAL
Status: DISCONTINUED | OUTPATIENT
Start: 2023-06-17 | End: 2023-06-18 | Stop reason: HOSPADM

## 2023-06-16 RX ORDER — METHYLERGONOVINE MALEATE 0.2 MG/ML
200 INJECTION INTRAVENOUS ONCE AS NEEDED
Status: DISCONTINUED | OUTPATIENT
Start: 2023-06-16 | End: 2023-06-18 | Stop reason: HOSPADM

## 2023-06-16 RX ORDER — OXYTOCIN/RINGER'S LACTATE 30/500 ML
PLASTIC BAG, INJECTION (ML) INTRAVENOUS
Status: DISCONTINUED | OUTPATIENT
Start: 2023-06-16 | End: 2023-06-16

## 2023-06-16 RX ORDER — MISOPROSTOL 200 UG/1
800 TABLET ORAL ONCE AS NEEDED
Status: DISCONTINUED | OUTPATIENT
Start: 2023-06-16 | End: 2023-06-18 | Stop reason: HOSPADM

## 2023-06-16 RX ORDER — PROCHLORPERAZINE EDISYLATE 5 MG/ML
5 INJECTION INTRAMUSCULAR; INTRAVENOUS EVERY 6 HOURS PRN
Status: DISCONTINUED | OUTPATIENT
Start: 2023-06-16 | End: 2023-06-18 | Stop reason: HOSPADM

## 2023-06-16 RX ORDER — ONDANSETRON HYDROCHLORIDE 2 MG/ML
INJECTION, SOLUTION INTRAMUSCULAR; INTRAVENOUS
Status: DISCONTINUED | OUTPATIENT
Start: 2023-06-16 | End: 2023-06-16

## 2023-06-16 RX ORDER — PHENYLEPHRINE HYDROCHLORIDE 10 MG/ML
INJECTION INTRAVENOUS
Status: DISCONTINUED | OUTPATIENT
Start: 2023-06-16 | End: 2023-06-16

## 2023-06-16 RX ORDER — TRANEXAMIC ACID 10 MG/ML
1000 INJECTION, SOLUTION INTRAVENOUS ONCE AS NEEDED
Status: DISCONTINUED | OUTPATIENT
Start: 2023-06-16 | End: 2023-06-18 | Stop reason: HOSPADM

## 2023-06-16 RX ORDER — AMOXICILLIN 250 MG
1 CAPSULE ORAL NIGHTLY PRN
Status: DISCONTINUED | OUTPATIENT
Start: 2023-06-16 | End: 2023-06-18 | Stop reason: HOSPADM

## 2023-06-16 RX ORDER — ONDANSETRON 8 MG/1
8 TABLET, ORALLY DISINTEGRATING ORAL EVERY 8 HOURS PRN
Status: DISCONTINUED | OUTPATIENT
Start: 2023-06-16 | End: 2023-06-18 | Stop reason: HOSPADM

## 2023-06-16 RX ORDER — DEXTROSE 40 %
30 GEL (GRAM) ORAL
Status: DISCONTINUED | OUTPATIENT
Start: 2023-06-16 | End: 2023-06-16

## 2023-06-16 RX ORDER — CARBOPROST TROMETHAMINE 250 UG/ML
250 INJECTION, SOLUTION INTRAMUSCULAR
Status: DISCONTINUED | OUTPATIENT
Start: 2023-06-16 | End: 2023-06-18 | Stop reason: HOSPADM

## 2023-06-16 RX ORDER — KETOROLAC TROMETHAMINE 30 MG/ML
30 INJECTION, SOLUTION INTRAMUSCULAR; INTRAVENOUS
Status: DISPENSED | OUTPATIENT
Start: 2023-06-16 | End: 2023-06-17

## 2023-06-16 RX ORDER — EPHEDRINE SULFATE 50 MG/ML
INJECTION, SOLUTION INTRAVENOUS
Status: DISCONTINUED | OUTPATIENT
Start: 2023-06-16 | End: 2023-06-16

## 2023-06-16 RX ORDER — INSULIN ASPART 100 [IU]/ML
1-10 INJECTION, SOLUTION INTRAVENOUS; SUBCUTANEOUS
Status: DISCONTINUED | OUTPATIENT
Start: 2023-06-16 | End: 2023-06-16

## 2023-06-16 RX ORDER — SIMETHICONE 80 MG
1 TABLET,CHEWABLE ORAL EVERY 6 HOURS PRN
Status: DISCONTINUED | OUTPATIENT
Start: 2023-06-16 | End: 2023-06-18 | Stop reason: HOSPADM

## 2023-06-16 RX ORDER — DIPHENHYDRAMINE HYDROCHLORIDE 50 MG/ML
12.5 INJECTION INTRAMUSCULAR; INTRAVENOUS
Status: DISCONTINUED | OUTPATIENT
Start: 2023-06-16 | End: 2023-06-18 | Stop reason: HOSPADM

## 2023-06-16 RX ORDER — OXYTOCIN/RINGER'S LACTATE 30/500 ML
95 PLASTIC BAG, INJECTION (ML) INTRAVENOUS CONTINUOUS
Status: DISCONTINUED | OUTPATIENT
Start: 2023-06-16 | End: 2023-06-18 | Stop reason: HOSPADM

## 2023-06-16 RX ORDER — FAMOTIDINE 10 MG/ML
20 INJECTION INTRAVENOUS
Status: COMPLETED | OUTPATIENT
Start: 2023-06-16 | End: 2023-06-16

## 2023-06-16 RX ORDER — DIPHENHYDRAMINE HCL 25 MG
25 CAPSULE ORAL EVERY 6 HOURS PRN
Status: DISCONTINUED | OUTPATIENT
Start: 2023-06-16 | End: 2023-06-18 | Stop reason: HOSPADM

## 2023-06-16 RX ORDER — ENOXAPARIN SODIUM 100 MG/ML
40 INJECTION SUBCUTANEOUS EVERY 24 HOURS
Status: DISCONTINUED | OUTPATIENT
Start: 2023-06-16 | End: 2023-06-16

## 2023-06-16 RX ORDER — ACETAMINOPHEN 325 MG/1
650 TABLET ORAL
Status: DISCONTINUED | OUTPATIENT
Start: 2023-06-16 | End: 2023-06-18 | Stop reason: HOSPADM

## 2023-06-16 RX ORDER — OXYCODONE HYDROCHLORIDE 5 MG/1
5 TABLET ORAL EVERY 4 HOURS PRN
Status: DISCONTINUED | OUTPATIENT
Start: 2023-06-16 | End: 2023-06-18 | Stop reason: HOSPADM

## 2023-06-16 RX ORDER — NALBUPHINE HYDROCHLORIDE 10 MG/ML
5 INJECTION, SOLUTION INTRAMUSCULAR; INTRAVENOUS; SUBCUTANEOUS ONCE AS NEEDED
Status: DISCONTINUED | OUTPATIENT
Start: 2023-06-16 | End: 2023-06-18 | Stop reason: HOSPADM

## 2023-06-16 RX ORDER — ACETAMINOPHEN 500 MG
1000 TABLET ORAL
Status: COMPLETED | OUTPATIENT
Start: 2023-06-16 | End: 2023-06-16

## 2023-06-16 RX ADMIN — OXYCODONE HYDROCHLORIDE 5 MG: 5 TABLET ORAL at 11:06

## 2023-06-16 RX ADMIN — CEFAZOLIN 3 G: 2 INJECTION, POWDER, FOR SOLUTION INTRAMUSCULAR; INTRAVENOUS at 07:06

## 2023-06-16 RX ADMIN — PRENATAL VIT W/ FE FUMARATE-FA TAB 27-0.8 MG 1 TABLET: 27-0.8 TAB at 09:06

## 2023-06-16 RX ADMIN — SODIUM CHLORIDE, SODIUM LACTATE, POTASSIUM CHLORIDE, AND CALCIUM CHLORIDE: .6; .31; .03; .02 INJECTION, SOLUTION INTRAVENOUS at 07:06

## 2023-06-16 RX ADMIN — GLYCOPYRROLATE 0.2 MG: 0.2 INJECTION, SOLUTION INTRAMUSCULAR; INTRAVENOUS at 07:06

## 2023-06-16 RX ADMIN — Medication 1 ML: at 07:06

## 2023-06-16 RX ADMIN — EPHEDRINE SULFATE 10 MG: 50 INJECTION, SOLUTION INTRAMUSCULAR; INTRAVENOUS; SUBCUTANEOUS at 07:06

## 2023-06-16 RX ADMIN — ACETAMINOPHEN 650 MG: 325 TABLET ORAL at 12:06

## 2023-06-16 RX ADMIN — KETOROLAC TROMETHAMINE 30 MG: 30 INJECTION, SOLUTION INTRAMUSCULAR; INTRAVENOUS at 08:06

## 2023-06-16 RX ADMIN — DOCUSATE SODIUM 200 MG: 100 CAPSULE, LIQUID FILLED ORAL at 09:06

## 2023-06-16 RX ADMIN — PHENYLEPHRINE HYDROCHLORIDE 100 MCG: 10 INJECTION INTRAVENOUS at 07:06

## 2023-06-16 RX ADMIN — ACETAMINOPHEN 650 MG: 325 TABLET ORAL at 06:06

## 2023-06-16 RX ADMIN — KETOROLAC TROMETHAMINE 30 MG: 30 INJECTION, SOLUTION INTRAMUSCULAR; INTRAVENOUS at 09:06

## 2023-06-16 RX ADMIN — BUPIVACAINE HYDROCHLORIDE IN DEXTROSE 1.4 ML: 7.5 INJECTION, SOLUTION SUBARACHNOID at 07:06

## 2023-06-16 RX ADMIN — SODIUM CITRATE AND CITRIC ACID MONOHYDRATE 30 ML: 500; 334 SOLUTION ORAL at 06:06

## 2023-06-16 RX ADMIN — EPHEDRINE SULFATE 15 MG: 50 INJECTION, SOLUTION INTRAMUSCULAR; INTRAVENOUS; SUBCUTANEOUS at 07:06

## 2023-06-16 RX ADMIN — FENTANYL CITRATE 20 MCG: 0.05 INJECTION, SOLUTION INTRAMUSCULAR; INTRAVENOUS at 07:06

## 2023-06-16 RX ADMIN — FAMOTIDINE 20 MG: 10 INJECTION INTRAVENOUS at 06:06

## 2023-06-16 RX ADMIN — ENOXAPARIN SODIUM 40 MG: 40 INJECTION SUBCUTANEOUS at 08:06

## 2023-06-16 RX ADMIN — KETOROLAC TROMETHAMINE 30 MG: 30 INJECTION, SOLUTION INTRAMUSCULAR; INTRAVENOUS at 02:06

## 2023-06-16 RX ADMIN — PHENYLEPHRINE HYDROCHLORIDE 200 MCG: 10 INJECTION INTRAVENOUS at 07:06

## 2023-06-16 RX ADMIN — ONDANSETRON 8 MG: 2 INJECTION INTRAMUSCULAR; INTRAVENOUS at 07:06

## 2023-06-16 RX ADMIN — SODIUM CHLORIDE, POTASSIUM CHLORIDE, SODIUM LACTATE AND CALCIUM CHLORIDE: 600; 310; 30; 20 INJECTION, SOLUTION INTRAVENOUS at 06:06

## 2023-06-16 RX ADMIN — EPHEDRINE SULFATE 5 MG: 50 INJECTION, SOLUTION INTRAMUSCULAR; INTRAVENOUS; SUBCUTANEOUS at 08:06

## 2023-06-16 RX ADMIN — ACETAMINOPHEN 1000 MG: 500 TABLET ORAL at 06:06

## 2023-06-16 RX ADMIN — OXYCODONE HYDROCHLORIDE 5 MG: 5 TABLET ORAL at 07:06

## 2023-06-16 RX ADMIN — DOCUSATE SODIUM 200 MG: 100 CAPSULE, LIQUID FILLED ORAL at 08:06

## 2023-06-16 NOTE — L&D DELIVERY NOTE
[facility] - Labor & Delivery   Section   Operative Note    SUMMARY     Date of Procedure: 2023     Procedure: Procedure(s) (LRB):  REPEAT  SECTION, WITH BILATERAL TUBAL LIGATION (N/A)    Surgeon(s) and Role:     * Giuliana Matthews MD - Primary    Assisting Surgeon: None    Pre-Operative Diagnosis: Obesity affecting pregnancy in third trimester [O99.213]  History of  section [Z98.891]  Unwanted fertility [Z30.09]  Gestational diabetes mellitus (GDM) in third trimester controlled on oral hypoglycemic drug [O24.415]    Post-Operative Diagnosis: Post-Op Diagnosis Codes:     * Obesity affecting pregnancy in third trimester [O99.213]     * History of  section [Z98.891]     * Unwanted fertility [Z30.09]     * Gestational diabetes mellitus (GDM) in third trimester controlled on oral hypoglycemic drug [O24.415]    Anesthesia: Spinal/Epidural    Technical Procedures Used:   1. Repeat low-transverse  section with vacuum assistance  2. Bilateral tubal ligation           Description of the Findings of the Procedure:  Viable male fetus in vertex presentation.  Vacuum extractor assistance needed with delivery of head.  Normal uterus, tubes, ovaries      Complications: No    Blood Loss:  cc, see delivery summary for QBL  UOP: 190 cc  IV fluids: 2 liters     With patient in supine position, the legs are  and Norris Catheter placed and positioning to supine done.   Abdomen prepped with Chloroprep and 3 minute drying time allowed prior to draping of the abdomen.   Time out taken with OR team members.  Pfannenstiel Incision made through the skin, transverse fascial incision developed, rectus muscles  in the midline and the peritoneum entered.   No  adhesions noted.  The lower uterine segment and position of the fetus identified.   Bladder flap taken down through transverse peritoneal incision.    Low Transverse Incision made through well developed lower uterine segment  and extended laterally with blunt dissection.   Clear fluid noted.  Infant delivered from vertex presentation with the assistance of the vacuum.  Cord clamped after one minute and  handed to attending nurse.  Cord blood taken, placenta delivered.  The uterus was exteriorized.  The edges of the uterine incision are grasped with ring forceps at the angles and the inferior and superior midline edges of the incision.    Closure with 0 PDS in a running locked fashion.   Observation for bleeding with suture of any bleeding along the hysterotomy line.   Attention was then turned to the right fallopian tube.  The tube was grasped with the Serena clamp and Bovie cautery used to create a window below it.  The tube was doubly tied with 2-0 chromic on each side of the window.  A 1 cm segment was removed with Metzenbaum scissors.  This segment was handed off for pathology.  Excellent hemostasis was noted.  Attention was then turned to the left fallopian tube where in a similar fashion it was grasped with a [place] clamp.  Bovie cautery was used to create a window below the tubal segment.  The tube was doubly tied on either side in a similar fashion with 2-0 chromic.  The 1 cm segment was removed with Metzenbaum scissors and handed off for pathology.   With good hemostasis noted, the anterior pelvis is rinsed with sterile saline.  The uterus was returned to the abdominal cavity.     Closure of the abdomen with 2-0 Vicryl running of the peritoneum and 2-0 chromic used to close the rectus.  The fascia was closed with 0 Vicryl starting at the left angle and tying the knot at the right angle.  The subcutaneous area closed with 3-0 Vicryl in a running fashion  Skin closure with staples.    Wound dressed with Aquacel.          Specimens:   Specimen (24h ago, onward)      None            Condition: Good    Disposition: PACU - hemodynamically stable.    Attestation:  Patient tolerated procedure well and all counts were correct at  the beginning and end of procedure         Delivery Information for Yair Oseguera    Birth information:  YOB: 2025   Time of birth: 7:41 AM   Sex: male   Head Delivery Date/Time: 2023  7:41 AM   Delivery type: , Low Transverse   Gestational Age: 37w2d        Delivery Providers    Delivering clinician: Giuliana Matthews MD           Measurements    Weight: 3657 g  Weight (lbs): 8 lb 1 oz  Length:          Apgars    Living status:   Apgars:  1 min.:  5 min.:  10 min.:  15 min.:  20 min.:    Skin color:         Heart rate:         Reflex irritability:         Muscle tone:         Respiratory effort:         Total:                  Operative Delivery    Forceps attempted?: No  Vacuum extractor attempted?: Yes  Vacuum type: Kiwi Pro (bell)         Shoulder Dystocia    Shoulder dystocia present?: No           Presentation    Presentation: Vertex           Interventions/Resuscitation    Method: Bulb Suctioning, Tactile Stimulation       Cord    Vessels: 3 vessels  Complications: None  Delayed Cord Clamping?: No  Cord Blood Disposition: Discarded  Gases Sent?: No  Stem Cell Collection (by MD): No       Placenta    Placenta delivery date/time: 2023 0742  Placenta removal: Manual removal  Placenta appearance: Intact  Placenta disposition: Discarded           Labor Events:       labor: No     Labor Onset Date/Time:         Dilation Complete Date/Time:         Start Pushing Date/Time:         Start Pushing Date/Time:       Rupture Date/Time:            Rupture type:          Fluid Amount:       Fluid Color:                steroids: None     Antibiotics given for GBS: No     Induction: none     Indications for induction:        Augmentation:       Indications for augmentation:       Labor complications: None     Additional complications:          Cervical ripening:                     Delivery:      Episiotomy:       Indication for Episiotomy:       Perineal  Lacerations:   Repaired:      Periurethral Laceration:   Repaired:     Labial Laceration:   Repaired:     Sulcus Laceration:   Repaired:     Vaginal Laceration:   Repaired:     Cervical Laceration:   Repaired:     Repair suture:       Repair # of packets:       Last Value - EBL - Nursing (mL):       Sum - EBL - Nursing (mL): 0     Last Value - EBL - Anesthesia (mL):      Calculated QBL (mL):       Vaginal Sweep Performed:       Surgicount Correct: Yes     Vaginal Packing:   Quantity:       Other providers:       Anesthesia    Method: Spinal          Details (if applicable):  Trial of Labor No    Categorization: Repeat    Priority: Routine   Indications for : Repeat Section   Incision Type: low transverse     Additional  information:  Forceps:    Vacuum:    Breech:    Observed anomalies    Other (Comments):             APPENDIX: [facility] Births Below 39 Weeks Gestation Worksheet  Reason(s) for Delivery (check all that apply):           Gestational - (Diagnosis in this pregnancy)

## 2023-06-16 NOTE — ANESTHESIA POSTPROCEDURE EVALUATION
Anesthesia Post Evaluation    Patient: Shawna Oseguera    Procedure(s) Performed: Procedure(s) (LRB):  REPEAT  SECTION, WITH BILATERAL TUBAL LIGATION (N/A)    Final Anesthesia Type: spinal      Patient location during evaluation: labor & delivery  Patient participation: Yes- Able to Participate  Level of consciousness: awake and alert and oriented  Post-procedure vital signs: reviewed and stable  Pain management: adequate  Airway patency: patent    PONV status at discharge: No PONV  Anesthetic complications: no      Cardiovascular status: blood pressure returned to baseline, hemodynamically stable and stable  Respiratory status: unassisted, spontaneous ventilation and room air  Hydration status: euvolemic  Follow-up not needed.          Vitals Value Taken Time   BP 95/51 23 0831   Temp 35.3 °C (95.5 °F) 23 0831   Pulse 69 23 0831   Resp  23 0831   SpO2 97 % 23 0828   Vitals shown include unvalidated device data.      No case tracking events are documented in the log.      Pain/Rickie Score: Pain Rating Prior to Med Admin: 0 (2023  6:14 AM)

## 2023-06-16 NOTE — NURSING
Notified Josesito of pt's continuous pulse staying 40s to 50s since recovery. Josesito states he had to give meds to keep her pulse up during the  and just to continue to monitor.

## 2023-06-16 NOTE — ANESTHESIA PROCEDURE NOTES
Spinal    Diagnosis:  Section  Patient location during procedure: OR  Start time: 2023 7:08 AM  Timeout: 2023 7:05 AM  End time: 2023 7:15 AM    Staffing  Authorizing Provider: Josesito Yuen CRNA  Performing Provider: Josesito Yuen CRNA    Preanesthetic Checklist  Completed: patient identified, IV checked, site marked, risks and benefits discussed, surgical consent, monitors and equipment checked, pre-op evaluation and timeout performed  Spinal Block  Patient position: sitting  Prep: ChloraPrep  Patient monitoring: heart rate and continuous pulse ox  Approach: midline  Location: L3-4  Injection technique: single shot  CSF Fluid: clear free-flowing CSF  Needle  Needle type: pencil-tip   Needle gauge: 22 G  Needle length: 5 in  Additional Documentation: incremental injection, negative aspiration for heme and no paresthesia on injection  Needle localization: anatomical landmarks  Assessment  Sensory level: T4   Dermatomal levels determined by alcohol wipe and pinch or prick  Ease of block: easy  Patient's tolerance of the procedure: comfortable throughout block and no complaints

## 2023-06-16 NOTE — TRANSFER OF CARE
"Anesthesia Transfer of Care Note    Patient: Shawna Oseguera    Procedure(s) Performed: Procedure(s) (LRB):  REPEAT  SECTION, WITH BILATERAL TUBAL LIGATION (N/A)    Patient location: Labor and Delivery    Anesthesia Type: spinal    Transport from OR: Transported from OR on room air with adequate spontaneous ventilation    Post pain: adequate analgesia    Post assessment: no apparent anesthetic complications and tolerated procedure well    Post vital signs: stable    Level of consciousness: awake, alert and oriented    Nausea/Vomiting: no nausea/vomiting    Complications: none    Transfer of care protocol was followed      Last vitals:   Visit Vitals  BP (!) 146/83   Pulse 69   Temp 35.6 °C (96.1 °F) (Temporal)   Ht 5' 2" (1.575 m)   Wt 119.7 kg (263 lb 14.3 oz)   SpO2 98%   Breastfeeding No   BMI 48.27 kg/m²     "

## 2023-06-16 NOTE — H&P
History and Physical  Obstetrics      SUBJECTIVE:     Shawna Oseguera is a 28 y.o.  female  at 37w2d weeks gestation with an Estimated Date of Delivery: 23 who is admitted for repeat  Section. She has been followed prenatally with the following prenatal complications: gestational DM, prior , desire for tubal sterilization. She denies Decreased Fetal Movement, Leakage of Fluid, and Vaginal Bleeding. Fetal Movement: normal.    Pt is a late transfer of care from [facility]. She has gestational diabetes that was diet controlled but subsequently required metformin. She has been seen by MFM since her transfer and it has been recommended to deliver during the 37th week    [facility] Problems    Diagnosis    *Gestational diabetes mellitus (GDM) in third trimester controlled on oral hypoglycemic drug    Unwanted fertility    History of  section    Pregnancy with poor obstetric history    2. History of prior pregnancy with IUGR     3. History of gestational hypertension    4. BMI affecting pregnancy in third trimester         HISTORY:     Prior to Admission medications    Medication Sig Start Date End Date Taking? Authorizing Provider   aspirin (ECOTRIN) 81 MG EC tablet Take 1 tablet (81 mg total) by mouth once daily. 23 Yes Giuliana Matthews MD   metFORMIN (GLUCOPHAGE) 500 MG tablet Take 1 tablet (500 mg total) by mouth daily with breakfast. W/ DIABETIC SNACK 23  Yes Joy Green NP   prenatal vit/iron fum/folic ac (PRENATAL 1+1 ORAL) Take by mouth.   Yes Historical Provider   blood sugar diagnostic Strp 1 each by Misc.(Non-Drug; Combo Route) route 4 (four) times daily. 23   Giuliana Matthews MD   TRUE METRIX GLUCOSE METER Misc USE AS DIRECTED TO TEST BLOOD SUGAR WHILE PREGNANT 22   Historical Provider   TRUEPLUS LANCETS 33 gauge Misc USE 1 TO CHECK GLUCOSE 4 TIMES DAILY FASTING THE MORNING, 2 HOUR AFTER BREAKFAST, LUNCH AND DINNER  23   Historical Provider     Past Medical History:   Diagnosis Date    Diabetes mellitus     Unspecified pre-eclampsia, unspecified trimester      Past Surgical History:   Procedure Laterality Date     SECTION       Family History   Problem Relation Age of Onset    Hypertension Mother     Diabetes Mother     Hypertension Sister     Diabetes Sister      Social History     Tobacco Use    Smoking status: Never    Smokeless tobacco: Never   Substance Use Topics    Alcohol use: Not Currently    Drug use: Never     OB History    Para Term  AB Living   3 2 2     2   SAB IAB Ectopic Multiple Live Births           2      # Outcome Date GA Lbr Colt/2nd Weight Sex Delivery Anes PTL Lv   3 Current            2 Term  38w0d  3.204 kg (7 lb 1 oz) M CS-LTranv   CAT      Birth Comments: GHTN   1 Term  37w0d  2.693 kg (5 lb 15 oz) M CS-Unspec   CAT      Birth Comments: oligo, GHTN       Allergy:   Review of patient's allergies indicates:  No Known Allergies       Review of Systems   Constitutional:  Negative for chills, fatigue and fever.   Respiratory:  Negative for shortness of breath.    Cardiovascular:  Negative for chest pain.   Gastrointestinal:  Negative for abdominal pain, constipation, diarrhea and nausea.   Genitourinary:  Negative for bladder incontinence, dysuria, hot flashes, pelvic pain and vaginal bleeding.   Musculoskeletal:  Positive for back pain.   Neurological:  Negative for headaches.   Psychiatric/Behavioral:  Negative for depression.        OBJECTIVE:     Initial Vitals   BP Pulse Resp Temp SpO2   23 0610 23 0614 -- 23 0610 23 0616   (!) 156/73 78  96.1 °F (35.6 °C) 98 %      MAP       --                    Physical Exam:  General:  alert,normal appearing gravid female   Skin:  Skin color, texture, turgor normal. No rashes or lesions   HEENT:  conjunctivae/corneas clear. EILEEN   Lungs:  clear to auscultation bilaterally   Heart:  regular rate and rhythm,  S1, S2 normal, no murmur, click, rub or gallop   Breasts:  Nipples are protruding and have no nipple discharge. No palpable masses, erythema, skin changes, tenderness, or adenopathy.   Abdomen:  soft, non-tender; bowel sounds normal   Uterine Size:  consistent with dates   Presentations:  cephalic   FHT:  145 BPM   Pelvis: External genitalia: normal external genitalia without lesions  Vaginal: without lesions or discharge   Cervix:     Dilation: deferred    Effacement:     Station:      Consistency:     Position:        OB Lab History:     Group & Rh   Date Value Ref Range Status   2023 O POS  Final     Indirect Adama   Date Value Ref Range Status   2023 NEG  Final     Lab Results   Component Value Date    WBC 9.59 2023    HGB 13.0 2023    HCT 38.4 2023    MCV 88 2023     2023         No results found for: TSH  No results found for: RUBELLAIGGAN  No results found for: RPR  HIV 1/2 Ag/Ab   Date Value Ref Range Status   2023 Non-reactive Non-reactive Final     No results found for: HEPBSAG  Results for orders placed or performed in visit on 23   OB Glucose Screen   Result Value Ref Range    OB Glucose Screen 151 mg/dL    Gluc 1  mg/dL     Results for orders placed or performed in visit on 23   Strep B Screen, Vaginal / Rectal    Specimen: Vaginal/Rectal   Result Value Ref Range    Strep B Culture No Group B Streptococcus isolated      No results found for this or any previous visit.  No results found for this or any previous visit.    ASSESSMENT/PLAN:     IUP 37w2d gestation  Gestational diabetes  Previous   Undesired fertility      PLAN:   Repeat  Section with BTL            Patient cleared for Anesthesia:  Spinal    Anesthesia/Surgery risks, benefits, and alternative options discussed and understood by patient/family.      Giuliana Matthews MD FACOG    2023  6:52 AM

## 2023-06-16 NOTE — PLAN OF CARE
Pt Stable. Vitals WNL. POC reviewed with pt and no questions or concerns at this time.  Problem: Adult Inpatient Plan of Care  Goal: Plan of Care Review  Outcome: Ongoing, Progressing  Flowsheets (Taken 6/16/2023 1104)  Plan of Care Reviewed With:   patient   spouse  Goal: Patient-Specific Goal (Individualized)  Outcome: Ongoing, Progressing  Flowsheets (Taken 6/16/2023 1104)  Anxieties, Fears or Concerns: None  Individualized Care Needs: Breastfeeding, Pain Managment  Goal: Absence of Hospital-Acquired Illness or Injury  Outcome: Ongoing, Progressing  Intervention: Identify and Manage Fall Risk  Flowsheets (Taken 6/16/2023 1104)  Safety Promotion/Fall Prevention:   assistive device/personal item within reach   Fall Risk reviewed with patient/family   family to remain at bedside   medications reviewed   nonskid shoes/socks when out of bed   side rails raised x 2   room near unit station  Intervention: Prevent Skin Injury  Flowsheets (Taken 6/16/2023 1104)  Body Position: position changed independently  Skin Protection: adhesive use limited  Intervention: Prevent and Manage VTE (Venous Thromboembolism) Risk  Flowsheets (Taken 6/16/2023 1104)  VTE Prevention/Management:   bleeding risk assessed   fluids promoted   remove, assess skin, and reapply sequential compression device  Intervention: Prevent Infection  Flowsheets (Taken 6/16/2023 1104)  Infection Prevention:   cohorting utilized   environmental surveillance performed   hand hygiene promoted  Goal: Optimal Comfort and Wellbeing  Outcome: Ongoing, Progressing  Intervention: Monitor Pain and Promote Comfort  Flowsheets (Taken 6/16/2023 1104)  Pain Management Interventions:   around-the-clock dosing utilized   care clustered   pain management plan reviewed with patient/caregiver  Intervention: Provide Person-Centered Care  Flowsheets (Taken 6/16/2023 1104)  Trust Relationship/Rapport:   care explained   choices provided   questions encouraged   questions  answered  Goal: Readiness for Transition of Care  Outcome: Ongoing, Progressing  Intervention: Mutually Develop Transition Plan  Flowsheets (Taken 2023 110)  Equipment Currently Used at Home: none  Transportation Anticipated: family or friend will provide  Who are your caregiver(s) and their phone number(s)?: Rober (spouse)- [phone]  Communicated VALERY with patient/caregiver: Yes  Do you expect to return to your current living situation?: Yes  Do you have help at home or someone to help you manage your care at home?: Yes  Readmission within 30 days?: No  Do you currently have service(s) that help you manage your care at home?: No  How Many hours does patient receive services: 0  Name and Contact number of agency: N/A  Is the pt/caregiver preference to resume services with current agency: No     Problem: Bariatric Environmental Safety  Goal: Safety Maintained with Care  Outcome: Ongoing, Progressing     Problem:  Fall Injury Risk  Goal: Absence of Fall, Infant Drop and Related Injury  Outcome: Ongoing, Progressing  Intervention: Identify and Manage Contributors  Flowsheets (Taken 2023 1104)  Self-Care Promotion: independence encouraged  Medication Review/Management: medications reviewed  Intervention: Prevent [place] Drop or Fall  Flowsheets (Taken 2023 1104)  Infant Drop Prevention:   safety rounds completed   placed in crib for maternal rest  Intervention: Promote Injury-Free Environment  Flowsheets (Taken 2023 1104)  Safety Promotion/Fall Prevention:   assistive device/personal item within reach   Fall Risk reviewed with patient/family   family to remain at bedside   medications reviewed   nonskid shoes/socks when out of bed   side rails raised x 2   room near unit station     Problem: Infection  Goal: Absence of Infection Signs and Symptoms  Outcome: Ongoing, Progressing  Intervention: Prevent or Manage Infection  Flowsheets (Taken 2023 1104)  Fever Reduction/Comfort  Measures:   lightweight bedding   lightweight clothing  Infection Management: aseptic technique maintained     Problem: Adjustment to Role Transition (Postpartum  Delivery)  Goal: Successful Maternal Role Transition  Outcome: Ongoing, Progressing  Intervention: Support Maternal Role Transition  Flowsheets (Taken 2023 1104)  Supportive Measures:   self-care encouraged   verbalization of feelings encouraged   decision-making supported  Parent/Child Attachment Promotion:   caring behavior modeled   cue recognition promoted   participation in care promoted   rooming-in promoted   skin-to-skin contact encouraged     Problem: Bleeding (Postpartum  Delivery)  Goal: Hemostasis  Outcome: Ongoing, Progressing     Problem: Infection (Postpartum  Delivery)  Goal: Absence of Infection Signs and Symptoms  Outcome: Ongoing, Progressing  Intervention: Prevent or Manage Infection  Flowsheets (Taken 2023 1104)  Fever Reduction/Comfort Measures:   lightweight bedding   lightweight clothing  Infection Management: aseptic technique maintained     Problem: Pain (Postpartum  Delivery)  Goal: Acceptable Pain Control  Outcome: Ongoing, Progressing  Intervention: Prevent or Manage Pain  Flowsheets (Taken 2023 1104)  Pain Management Interventions:   around-the-clock dosing utilized   care clustered   pain management plan reviewed with patient/caregiver     Problem: Postoperative Nausea and Vomiting (Postpartum  Delivery)  Goal: Nausea and Vomiting Relief  Outcome: Ongoing, Progressing     Problem: Postoperative Urinary Retention (Postpartum  Delivery)  Goal: Effective Urinary Elimination  Outcome: Ongoing, Progressing  Intervention: Monitor and Manage Urinary Retention  Flowsheets (Taken 2023 1104)  Urinary Elimination Promotion: catheter patency maintained     Problem: Breastfeeding  Goal: Effective Breastfeeding  Outcome: Ongoing, Progressing  Intervention: Promote Breast  Care and Comfort  Flowsheets (Taken 6/16/2023 1104)  Breast Care: Breastfeeding: supportive bra utilized  Intervention: Promote Effective Breastfeeding  Flowsheets (Taken 6/16/2023 1104)  Breastfeeding Assistance:   feeding cue recognition promoted   feeding on demand promoted   support offered  Parent/Child Attachment Promotion:   caring behavior modeled   cue recognition promoted   participation in care promoted   rooming-in promoted   skin-to-skin contact encouraged  Intervention: Support Exclusive Breastfeeding Success  Flowsheets (Taken 6/16/2023 1104)  Supportive Measures:   self-care encouraged   verbalization of feelings encouraged   decision-making supported  Breastfeeding Support:   diary/feeding log utilized   infant-mother separation minimized   maternal rest encouraged   maternal hydration promoted   maternal nutrition promoted

## 2023-06-16 NOTE — NURSING
Notified Dr. Garcia of patient HR in 40-50s since recovery, with other vitals WNL. MD states it is okay to give Oxy 5 mg for pain and continue to monitor.

## 2023-06-16 NOTE — ANESTHESIA PREPROCEDURE EVALUATION
06/16/2023  Shawna Oseguera is a 28 y.o., female.      Pre-op Assessment    I have reviewed the Patient Summary Reports.     I have reviewed the Nursing Notes. I have reviewed the NPO Status.   I have reviewed the Medications.     Review of Systems  Anesthesia Hx:  No problems with previous Anesthesia  History of prior surgery of interest to airway management or planning:  Denies Personal Hx of Anesthesia complications.   Social:  Non-Smoker, No Alcohol Use    Hematology/Oncology:  Hematology Normal   Oncology Normal     EENT/Dental:EENT/Dental Normal   Cardiovascular:   Exercise tolerance: good Hypertension, well controlled    Pulmonary:  Pulmonary Normal    Renal/:  Renal/ Normal     Hepatic/GI:  Hepatic/GI Normal    Musculoskeletal:  Musculoskeletal Normal    Neurological:  Neurology Normal    Endocrine:   Diabetes, well controlled, gestational  Morbid Obesity / BMI > 40  Dermatological:  Skin Normal    Psych:  Psychiatric Normal           Physical Exam  General: Well nourished, Cooperative, Alert and Oriented    Airway:  Mallampati: III   Mouth Opening: Normal  TM Distance: Normal  Tongue: Large  Neck ROM: Normal ROM    Dental:  Intact        Anesthesia Plan  Type of Anesthesia, risks & benefits discussed:    Anesthesia Type: Spinal, Gen ETT  Intra-op Monitoring Plan: Standard ASA Monitors  Post Op Pain Control Plan: multimodal analgesia and intrathecal opioid  Informed Consent: Informed consent signed with the Patient and all parties understand the risks and agree with anesthesia plan.  All questions answered. Patient consented to blood products? Yes  ASA Score: 3  Day of Surgery Review of History & Physical: H&P Update referred to the surgeon/provider.I have interviewed and examined the patient. I have reviewed the patient's H&P dated: 6/16/23. There are no significant changes.   Anesthesia  Plan Notes:  used for consent and explanation of anesthesia, video  system to be used in OR    Ready For Surgery From Anesthesia Perspective.     .

## 2023-06-17 LAB
BASOPHILS # BLD AUTO: 0.07 K/UL (ref 0–0.2)
BASOPHILS NFR BLD: 0.8 % (ref 0–1.9)
DIFFERENTIAL METHOD: ABNORMAL
EOSINOPHIL # BLD AUTO: 0.1 K/UL (ref 0–0.5)
EOSINOPHIL NFR BLD: 1.1 % (ref 0–8)
ERYTHROCYTE [DISTWIDTH] IN BLOOD BY AUTOMATED COUNT: 13.8 % (ref 11.5–14.5)
HCT VFR BLD AUTO: 37.8 % (ref 37–48.5)
HGB BLD-MCNC: 12.6 G/DL (ref 12–16)
IMM GRANULOCYTES # BLD AUTO: 0.06 K/UL (ref 0–0.04)
IMM GRANULOCYTES NFR BLD AUTO: 0.7 % (ref 0–0.5)
LYMPHOCYTES # BLD AUTO: 1.7 K/UL (ref 1–4.8)
LYMPHOCYTES NFR BLD: 19.7 % (ref 18–48)
MCH RBC QN AUTO: 30.1 PG (ref 27–31)
MCHC RBC AUTO-ENTMCNC: 33.3 G/DL (ref 32–36)
MCV RBC AUTO: 90 FL (ref 82–98)
MONOCYTES # BLD AUTO: 0.6 K/UL (ref 0.3–1)
MONOCYTES NFR BLD: 7.2 % (ref 4–15)
NEUTROPHILS # BLD AUTO: 6 K/UL (ref 1.8–7.7)
NEUTROPHILS NFR BLD: 70.5 % (ref 38–73)
NRBC BLD-RTO: 0 /100 WBC
PLATELET # BLD AUTO: 182 K/UL (ref 150–450)
PMV BLD AUTO: 12 FL (ref 9.2–12.9)
RBC # BLD AUTO: 4.18 M/UL (ref 4–5.4)
WBC # BLD AUTO: 8.56 K/UL (ref 3.9–12.7)

## 2023-06-17 PROCEDURE — 11000001 HC ACUTE MED/SURG PRIVATE ROOM

## 2023-06-17 PROCEDURE — 63600175 PHARM REV CODE 636 W HCPCS: Performed by: OBSTETRICS & GYNECOLOGY

## 2023-06-17 PROCEDURE — 85025 COMPLETE CBC W/AUTO DIFF WBC: CPT | Performed by: OBSTETRICS & GYNECOLOGY

## 2023-06-17 PROCEDURE — 99232 SBSQ HOSP IP/OBS MODERATE 35: CPT | Mod: ,,, | Performed by: OBSTETRICS & GYNECOLOGY

## 2023-06-17 PROCEDURE — 99232 PR SUBSEQUENT HOSPITAL CARE,LEVL II: ICD-10-PCS | Mod: ,,, | Performed by: OBSTETRICS & GYNECOLOGY

## 2023-06-17 PROCEDURE — 36415 COLL VENOUS BLD VENIPUNCTURE: CPT | Performed by: OBSTETRICS & GYNECOLOGY

## 2023-06-17 PROCEDURE — 25000003 PHARM REV CODE 250: Performed by: OBSTETRICS & GYNECOLOGY

## 2023-06-17 RX ADMIN — PRENATAL VIT W/ FE FUMARATE-FA TAB 27-0.8 MG 1 TABLET: 27-0.8 TAB at 08:06

## 2023-06-17 RX ADMIN — ACETAMINOPHEN 650 MG: 325 TABLET ORAL at 05:06

## 2023-06-17 RX ADMIN — IBUPROFEN 800 MG: 800 TABLET, FILM COATED ORAL at 11:06

## 2023-06-17 RX ADMIN — ENOXAPARIN SODIUM 40 MG: 40 INJECTION SUBCUTANEOUS at 08:06

## 2023-06-17 RX ADMIN — OXYCODONE HYDROCHLORIDE 10 MG: 5 TABLET ORAL at 07:06

## 2023-06-17 RX ADMIN — DOCUSATE SODIUM 200 MG: 100 CAPSULE, LIQUID FILLED ORAL at 08:06

## 2023-06-17 RX ADMIN — OXYCODONE HYDROCHLORIDE 5 MG: 5 TABLET ORAL at 08:06

## 2023-06-17 RX ADMIN — IBUPROFEN 800 MG: 800 TABLET, FILM COATED ORAL at 07:06

## 2023-06-17 RX ADMIN — ACETAMINOPHEN 650 MG: 325 TABLET ORAL at 11:06

## 2023-06-17 RX ADMIN — ACETAMINOPHEN 650 MG: 325 TABLET ORAL at 12:06

## 2023-06-17 RX ADMIN — ENOXAPARIN SODIUM 40 MG: 40 INJECTION SUBCUTANEOUS at 07:06

## 2023-06-17 RX ADMIN — OXYCODONE HYDROCHLORIDE 10 MG: 5 TABLET ORAL at 02:06

## 2023-06-17 RX ADMIN — IBUPROFEN 800 MG: 800 TABLET, FILM COATED ORAL at 05:06

## 2023-06-17 RX ADMIN — OXYCODONE HYDROCHLORIDE 5 MG: 5 TABLET ORAL at 01:06

## 2023-06-17 RX ADMIN — DOCUSATE SODIUM 200 MG: 100 CAPSULE, LIQUID FILLED ORAL at 07:06

## 2023-06-17 NOTE — PLAN OF CARE
[facility] - Labor & Delivery  Discharge Assessment    Primary Care Provider: Giuliana Matthews MD     OB Screen (most recent)       OB Screen - 23 0719          OB SCREEN    Source of Information health record     Received Prenatal Care Yes   Transfer of care from [facility] per MD    Any indications/suspicions for None     Is this a teen pregnancy No     Is the baby in NICU No     Indication for adoption/Safe Haven No     Indication for DME/post-acute needs No     HIV (+) No     Any congenital  disorders No     Fetal demise/ death No                         This patient has been screened for Case Management needs.  Based on documentation in medical record, no needs are anticipated at this time. Case Management/Social Work remains available if a need arises, please enter consult for assistance.  For urgent needs contact Case Management Department/on-call at:  [phone].

## 2023-06-17 NOTE — PROGRESS NOTES
[facility] - Labor & Delivery  Obstetrics  Postpartum Progress Note    Patient Name: Shawna Oseguera  MRN: [id number]  Admission Date: 6/16/2023  Hospital Length of Stay: 1 days  Attending Physician: Giuliana Matthews MD  Primary Care Provider: Giuliana Matthews MD    Subjective:     Principal Problem:Gestational diabetes mellitus (GDM) in third trimester controlled on oral hypoglycemic drug    Hospital Course:  HD#1 RLTCS/BTL  HD#2 Routine post op care      Interval History:     She is doing well this morning. She is tolerating a regular diet without nausea or vomiting. She is voiding spontaneously. She is ambulating. She has not passed flatus, and has not a BM. Vaginal bleeding is mild. She denies fever or chills. Abdominal pain is mild and controlled with oral medications. She Is breastfeeding. She desires circumcision for her male baby: no.    Objective:     Vital Signs (Most Recent):  Temp: 96.3 °F (35.7 °C) (06/17/23 0505)  Pulse: 65 (06/17/23 0505)  Resp: 16 (06/17/23 0505)  BP: 134/78 (06/17/23 0505)  SpO2: 99 % (06/17/23 0505) Vital Signs (24h Range):  Temp:  [96.3 °F (35.7 °C)-96.6 °F (35.9 °C)] 96.3 °F (35.7 °C)  Pulse:  [47-65] 65  Resp:  [16-20] 16  SpO2:  [96 %-100 %] 99 %  BP: (117-154)/(58-78) 134/78     Weight: 119.7 kg (263 lb 14.3 oz)  Body mass index is 48.27 kg/m².      Intake/Output Summary (Last 24 hours) at 6/17/2023 0843  Last data filed at 6/16/2023 1935  Gross per 24 hour   Intake --   Output 1500 ml   Net -1500 ml         Significant Labs:  Lab Results   Component Value Date    GROUPTRH O POS 06/16/2023    STREPBCULT No Group B Streptococcus isolated 06/07/2023     Recent Labs   Lab 06/17/23  0625   HGB 12.6   HCT 37.8       I have personallly reviewed all pertinent lab results from the last 24 hours.    Physical Exam:   Constitutional: She is oriented to person, place, and time. She appears well-developed and well-nourished.    HENT:   Head: Normocephalic and atraumatic.    Eyes: Pupils are  equal, round, and reactive to light. EOM are normal.     Cardiovascular:  Normal rate and regular rhythm.             Pulmonary/Chest: Effort normal and breath sounds normal.        Abdominal: Soft. Bowel sounds are normal. She exhibits abdominal incision. There is abdominal tenderness.     Genitourinary:    Vagina and uterus normal.             Musculoskeletal: Normal range of motion and moves all extremeties.       Neurological: She is alert and oriented to person, place, and time.    Skin: Skin is warm and dry.    Psychiatric: She has a normal mood and affect.     Review of Systems   Constitutional:  Negative for activity change, appetite change, chills, diaphoresis, fatigue, fever and unexpected weight change.   HENT:  Negative for mouth sores and tinnitus.    Eyes:  Negative for discharge and visual disturbance.   Respiratory:  Negative for cough, shortness of breath and wheezing.    Cardiovascular:  Negative for chest pain, palpitations and leg swelling.   Gastrointestinal:  Negative for abdominal pain, bloating, blood in stool, constipation, diarrhea, nausea and vomiting.   Endocrine: Negative for diabetes, hair loss, hot flashes, hyperthyroidism and hypothyroidism.   Genitourinary:  Negative for dysuria, flank pain, frequency, genital sores, hematuria, urgency, vaginal bleeding, vaginal discharge, vaginal pain, postcoital bleeding and vaginal odor.   Musculoskeletal:  Negative for back pain, joint swelling and myalgias.   Integumentary:  Negative for rash, acne, breast mass, nipple discharge and breast skin changes.   Neurological:  Negative for seizures, syncope, numbness and headaches.   Hematological:  Negative for adenopathy. Does not bruise/bleed easily.   Psychiatric/Behavioral:  Negative for depression and sleep disturbance. The patient is not nervous/anxious.    Breast: Negative for mass, mastodynia, nipple discharge and skin changes    Assessment/Plan:     28 y.o. female  for:    *  Gestational diabetes mellitus (GDM) in third trimester controlled on oral hypoglycemic drug  2 hr GTT postpartum    History of  section  S/p RLTCS/BTL    Routine post op care        Disposition: As patient meets milestones, will plan to discharge 1-2 days.    Mel Dsouza MD  Obstetrics  [facility] - Labor & Delivery

## 2023-06-17 NOTE — NURSING
Pt remained stable throughout shift. Ambulating and voiding without difficulty. Pain well controlled with pain medication. Fundus midline, firm, with scant lochia. Pt responds well to infant cues and tends to infant needs. Will continue to monitor pt vitals and pain control. Will proceed with routine postpartal POC.

## 2023-06-17 NOTE — SUBJECTIVE & OBJECTIVE
Interval History:     She is doing well this morning. She is tolerating a regular diet without nausea or vomiting. She is voiding spontaneously. She is ambulating. She has not passed flatus, and has not a BM. Vaginal bleeding is mild. She denies fever or chills. Abdominal pain is mild and controlled with oral medications. She Is breastfeeding. She desires circumcision for her male baby: no.    Objective:     Vital Signs (Most Recent):  Temp: 96.3 °F (35.7 °C) (06/17/23 0505)  Pulse: 65 (06/17/23 0505)  Resp: 16 (06/17/23 0505)  BP: 134/78 (06/17/23 0505)  SpO2: 99 % (06/17/23 0505) Vital Signs (24h Range):  Temp:  [96.3 °F (35.7 °C)-96.6 °F (35.9 °C)] 96.3 °F (35.7 °C)  Pulse:  [47-65] 65  Resp:  [16-20] 16  SpO2:  [96 %-100 %] 99 %  BP: (117-154)/(58-78) 134/78     Weight: 119.7 kg (263 lb 14.3 oz)  Body mass index is 48.27 kg/m².      Intake/Output Summary (Last 24 hours) at 6/17/2023 0843  Last data filed at 6/16/2023 1935  Gross per 24 hour   Intake --   Output 1500 ml   Net -1500 ml         Significant Labs:  Lab Results   Component Value Date    GROUPTRH O POS 06/16/2023    STREPBCULT No Group B Streptococcus isolated 06/07/2023     Recent Labs   Lab 06/17/23  0625   HGB 12.6   HCT 37.8       I have personallly reviewed all pertinent lab results from the last 24 hours.    Physical Exam:   Constitutional: She is oriented to person, place, and time. She appears well-developed and well-nourished.    HENT:   Head: Normocephalic and atraumatic.    Eyes: Pupils are equal, round, and reactive to light. EOM are normal.     Cardiovascular:  Normal rate and regular rhythm.             Pulmonary/Chest: Effort normal and breath sounds normal.        Abdominal: Soft. Bowel sounds are normal. She exhibits abdominal incision. There is abdominal tenderness.     Genitourinary:    Vagina and uterus normal.             Musculoskeletal: Normal range of motion and moves all extremeties.       Neurological: She is alert and  oriented to person, place, and time.    Skin: Skin is warm and dry.    Psychiatric: She has a normal mood and affect.     Review of Systems   Constitutional:  Negative for activity change, appetite change, chills, diaphoresis, fatigue, fever and unexpected weight change.   HENT:  Negative for mouth sores and tinnitus.    Eyes:  Negative for discharge and visual disturbance.   Respiratory:  Negative for cough, shortness of breath and wheezing.    Cardiovascular:  Negative for chest pain, palpitations and leg swelling.   Gastrointestinal:  Negative for abdominal pain, bloating, blood in stool, constipation, diarrhea, nausea and vomiting.   Endocrine: Negative for diabetes, hair loss, hot flashes, hyperthyroidism and hypothyroidism.   Genitourinary:  Negative for dysuria, flank pain, frequency, genital sores, hematuria, urgency, vaginal bleeding, vaginal discharge, vaginal pain, postcoital bleeding and vaginal odor.   Musculoskeletal:  Negative for back pain, joint swelling and myalgias.   Integumentary:  Negative for rash, acne, breast mass, nipple discharge and breast skin changes.   Neurological:  Negative for seizures, syncope, numbness and headaches.   Hematological:  Negative for adenopathy. Does not bruise/bleed easily.   Psychiatric/Behavioral:  Negative for depression and sleep disturbance. The patient is not nervous/anxious.    Breast: Negative for mass, mastodynia, nipple discharge and skin changes

## 2023-06-17 NOTE — PLAN OF CARE
Problem: Adult Inpatient Plan of Care  Goal: Plan of Care Review  Outcome: Ongoing, Progressing  Goal: Patient-Specific Goal (Individualized)  Outcome: Ongoing, Progressing  Goal: Absence of Hospital-Acquired Illness or Injury  Outcome: Ongoing, Progressing  Goal: Optimal Comfort and Wellbeing  Outcome: Ongoing, Progressing  Goal: Readiness for Transition of Care  Outcome: Ongoing, Progressing     Problem: Bariatric Environmental Safety  Goal: Safety Maintained with Care  Outcome: Ongoing, Progressing     Problem:  Fall Injury Risk  Goal: Absence of Fall, Infant Drop and Related Injury  Outcome: Ongoing, Progressing     Problem: Infection  Goal: Absence of Infection Signs and Symptoms  Outcome: Ongoing, Progressing     Problem: Adjustment to Role Transition (Postpartum  Delivery)  Goal: Successful Maternal Role Transition  Outcome: Ongoing, Progressing     Problem: Bleeding (Postpartum  Delivery)  Goal: Hemostasis  Outcome: Ongoing, Progressing     Problem: Infection (Postpartum  Delivery)  Goal: Absence of Infection Signs and Symptoms  Outcome: Ongoing, Progressing     Problem: Pain (Postpartum  Delivery)  Goal: Acceptable Pain Control  Outcome: Ongoing, Progressing     Problem: Postoperative Nausea and Vomiting (Postpartum  Delivery)  Goal: Nausea and Vomiting Relief  Outcome: Ongoing, Progressing     Problem: Postoperative Urinary Retention (Postpartum  Delivery)  Goal: Effective Urinary Elimination  Outcome: Ongoing, Progressing     Problem: Breastfeeding  Goal: Effective Breastfeeding  Outcome: Ongoing, Progressing

## 2023-06-17 NOTE — ADDENDUM NOTE
Addendum  created 06/17/23 0840 by Josesito Yuen CRNA    Clinical Note Signed, Intraprocedure Blocks edited, SmartForm saved

## 2023-06-17 NOTE — LACTATION NOTE
This note was copied from a baby's chart.  Nineteen-hour-old male infant born via R C/S. Mother breastfeeding infant independently, supplementing with formula by choice, latched infant one time on shift, see flowsheet for details. Adequate intake and output, see flowsheet for details. Parents educated about adequate intake and output, utilizing breastfeeding log.

## 2023-06-18 VITALS
SYSTOLIC BLOOD PRESSURE: 145 MMHG | DIASTOLIC BLOOD PRESSURE: 81 MMHG | HEART RATE: 93 BPM | OXYGEN SATURATION: 98 % | RESPIRATION RATE: 18 BRPM | WEIGHT: 263.88 LBS | TEMPERATURE: 98 F | BODY MASS INDEX: 48.56 KG/M2 | HEIGHT: 62 IN

## 2023-06-18 PROBLEM — Z98.891 S/P CESAREAN SECTION: Status: ACTIVE | Noted: 2023-06-18

## 2023-06-18 PROCEDURE — 25000003 PHARM REV CODE 250: Performed by: OBSTETRICS & GYNECOLOGY

## 2023-06-18 PROCEDURE — 63600175 PHARM REV CODE 636 W HCPCS: Performed by: OBSTETRICS & GYNECOLOGY

## 2023-06-18 PROCEDURE — 99238 HOSP IP/OBS DSCHRG MGMT 30/<: CPT | Mod: ,,, | Performed by: OBSTETRICS & GYNECOLOGY

## 2023-06-18 PROCEDURE — 99238 PR HOSPITAL DISCHARGE DAY,<30 MIN: ICD-10-PCS | Mod: ,,, | Performed by: OBSTETRICS & GYNECOLOGY

## 2023-06-18 RX ORDER — IBUPROFEN 800 MG/1
800 TABLET ORAL EVERY 8 HOURS PRN
Qty: 30 TABLET | Refills: 0 | Status: ON HOLD | OUTPATIENT
Start: 2023-06-18 | End: 2023-07-04

## 2023-06-18 RX ORDER — OXYCODONE HYDROCHLORIDE 5 MG/1
5 TABLET ORAL EVERY 6 HOURS PRN
Qty: 14 TABLET | Refills: 0 | Status: ON HOLD | OUTPATIENT
Start: 2023-06-18 | End: 2023-07-04

## 2023-06-18 RX ADMIN — IBUPROFEN 800 MG: 800 TABLET, FILM COATED ORAL at 04:06

## 2023-06-18 RX ADMIN — ACETAMINOPHEN 650 MG: 325 TABLET ORAL at 05:06

## 2023-06-18 RX ADMIN — ENOXAPARIN SODIUM 40 MG: 40 INJECTION SUBCUTANEOUS at 08:06

## 2023-06-18 RX ADMIN — SENNOSIDES AND DOCUSATE SODIUM 1 TABLET: 50; 8.6 TABLET ORAL at 04:06

## 2023-06-18 RX ADMIN — PRENATAL VIT W/ FE FUMARATE-FA TAB 27-0.8 MG 1 TABLET: 27-0.8 TAB at 08:06

## 2023-06-18 RX ADMIN — DOCUSATE SODIUM 200 MG: 100 CAPSULE, LIQUID FILLED ORAL at 08:06

## 2023-06-18 NOTE — DISCHARGE SUMMARY
[facility] - Labor & Delivery  Obstetrics  Discharge Summary      Patient Name: Shawna Oseguera  MRN: [id number]  Admission Date: 2023  Hospital Length of Stay: 2 days  Discharge Date and Time:  2023 8:27 AM  Attending Physician: Jyotsna Matthews MD   Discharging Provider: Mel Dsouza MD   Primary Care Provider: Jyotsna Matthews MD    HPI: Admit for RLTCS/BTL          Procedure(s) (LRB):  REPEAT  SECTION, WITH BILATERAL TUBAL LIGATION (N/A)     Hospital Course:   HD#1 RLTCS/BTL  HD#2 Routine post op care       Consults (From admission, onward)        Status Ordering Provider     Inpatient consult to Lactation  Once        Provider:  (Not yet assigned)    Acknowledged JYOTSNA MATTHEWS     Inpatient consult to Anesthesiology  Once        Provider:  (Not yet assigned)    Acknowledged JYOTSNA MATTHEWS          Final Active Diagnoses:    Diagnosis Date Noted POA    PRINCIPAL PROBLEM:  S/P  section [Z98.891] 2023 Not Applicable    Unwanted fertility [Z30.09] 06/15/2023 Yes    History of  section [Z98.891] 06/15/2023 Not Applicable    Pregnancy with poor obstetric history [O09.299] 06/15/2023 Not Applicable    Gestational diabetes mellitus (GDM) in third trimester controlled on oral hypoglycemic drug [O24.415] 2023 Yes    2. History of prior pregnancy with IUGR  [Z87.59] 2023 Not Applicable    3. History of gestational hypertension [Z87.59] 2023 Not Applicable    4. BMI affecting pregnancy in third trimester [O99.213] 2023 Yes      Problems Resolved During this Admission:        Significant Diagnostic Studies: Labs:   CBC   Recent Labs   Lab 23  0625   WBC 8.56   HGB 12.6   HCT 37.8            Feeding Method: both breast and bottle    Immunizations     None          Delivery:    Episiotomy:     Lacerations:     Repair suture:     Repair # of packets:     Blood loss (ml):       Birth information:  YOB: 2025    Time of birth: 7:41 AM   Sex: male   Delivery type: , Low Transverse   Gestational Age: 37w2d    Delivery Clinician:      Other providers:       Additional  information:  Forceps:    Vacuum:    Breech:    Observed anomalies      Living?:           APGARS  One minute Five minutes Ten minutes   Skin color:         Heart rate:         Grimace:         Muscle tone:         Breathing:         Totals: 8  9        Placenta: Delivered:       appearance    Pending Diagnostic Studies:     Procedure Component Value Units Date/Time    Specimen to Pathology, Surgery Gynecology and Obstetrics [[id number]] Collected: 23 0753    Order Status: Sent Lab Status: In process Updated: 231    Specimen: Tissue           Discharged Condition: good    Disposition: Home or Self Care    Follow Up:   Follow-up Information     Giuliana Matthews MD Follow up.    Specialty: Obstetrics and Gynecology  Contact information:  [address] [place] [id number]  [phone]                       Patient Instructions:      Diet Adult Regular     Lifting restrictions     Pelvic Rest     No dressing needed     Notify your health care provider if you experience any of the following:  temperature >100.4     Notify your health care provider if you experience any of the following:  persistent nausea and vomiting or diarrhea     Notify your health care provider if you experience any of the following:  severe uncontrolled pain     Notify your health care provider if you experience any of the following:  redness, tenderness, or signs of infection (pain, swelling, redness, odor or green/yellow discharge around incision site)     Notify your health care provider if you experience any of the following:  difficulty breathing or increased cough     Medications:  Current Discharge Medication List      START taking these medications    Details   ibuprofen (ADVIL,MOTRIN) 800 MG tablet Take 1 tablet (800 mg total) by mouth  every 8 (eight) hours as needed for Pain.  Qty: 30 tablet, Refills: 0      oxyCODONE (ROXICODONE) 5 MG immediate release tablet Take 1 tablet (5 mg total) by mouth every 6 (six) hours as needed for Pain.  Qty: 14 tablet, Refills: 0    Comments: Quantity prescribed more than 7 day supply? No         CONTINUE these medications which have NOT CHANGED    Details   prenatal vit/iron fum/folic ac (PRENATAL 1+1 ORAL) Take by mouth.         STOP taking these medications       aspirin (ECOTRIN) 81 MG EC tablet Comments:   Reason for Stopping:         metFORMIN (GLUCOPHAGE) 500 MG tablet Comments:   Reason for Stopping:         blood sugar diagnostic Strp Comments:   Reason for Stopping:         TRUE METRIX GLUCOSE METER Misc Comments:   Reason for Stopping:         TRUEPLUS LANCETS 33 gauge Misc Comments:   Reason for Stopping:               Mel Dsouza MD  Obstetrics  [facility] - Labor & Delivery

## 2023-06-18 NOTE — NURSING
Patient off unit for discharge via wheelchair in stable condition with s/o and infant properly restrained in carseat.  Accompanied by floor nurse    Quality 110: Preventive Care And Screening: Influenza Immunization: Influenza Immunization Administered during Influenza season Quality 431: Preventive Care And Screening: Unhealthy Alcohol Use - Screening: Patient not identified as an unhealthy alcohol user when screened for unhealthy alcohol use using a systematic screening method Detail Level: Detailed Quality 226: Preventive Care And Screening: Tobacco Use: Screening And Cessation Intervention: Patient screened for tobacco use and is an ex/non-smoker

## 2023-06-18 NOTE — NURSING
Discharge instructions provided, all questions answered.follow up appointment made and given to pt. Patient verbalized understanding.  Instructed to call for wheelchair when ready.

## 2023-06-18 NOTE — DISCHARGE INSTRUCTIONS
Postpartum Discharge Instructions:    · No heavy lifting, straining, take frequent rest periods  · Pelvic rest--no douching, tampons, or intercourse until released by MD  · Talk to your doctor about birth control--remember breastfeeding is not a method of birth control  · Notify MD if bleeding becomes heavier than usual and if large clots, painful cramping,or foul odor develops.   Vaginal discharge will lighten and decrease in amount gradually.    · Cleanse perineal area from front to back after urination or having a bowel movement.  · If not breastfeeding, wear tight fitting sports bra for 1 week--remove only to bathe  · Remember to keep your breast clean and dry to prevent any cracking  · Notify MD if breast become reddened,swollen, nipples bleed or crack, or fever greater than 100.4  · Notify MD of pain, swelling,  Redness, or heat developing in back of leg especially when foot is flexed toward body  · Look at incision everyday for redness, swelling, or drainage which may indicate infection  · Notify MD of pain not relieved by pain medication.  · Call MD or go to ER for any concerns

## 2023-06-18 NOTE — PLAN OF CARE
Pt found adequate for d/c per MD .     Problem: Adult Inpatient Plan of Care  Goal: Plan of Care Review  Outcome: Met  Goal: Patient-Specific Goal (Individualized)  Outcome: Met  Goal: Absence of Hospital-Acquired Illness or Injury  Outcome: Met  Goal: Optimal Comfort and Wellbeing  Outcome: Met  Goal: Readiness for Transition of Care  Outcome: Met     Problem: Bariatric Environmental Safety  Goal: Safety Maintained with Care  Outcome: Met     Problem:  Fall Injury Risk  Goal: Absence of Fall, Infant Drop and Related Injury  Outcome: Met     Problem: Infection  Goal: Absence of Infection Signs and Symptoms  Outcome: Met     Problem: Adjustment to Role Transition (Postpartum  Delivery)  Goal: Successful Maternal Role Transition  Outcome: Met     Problem: Bleeding (Postpartum  Delivery)  Goal: Hemostasis  Outcome: Met     Problem: Infection (Postpartum  Delivery)  Goal: Absence of Infection Signs and Symptoms  Outcome: Met     Problem: Pain (Postpartum  Delivery)  Goal: Acceptable Pain Control  Outcome: Met     Problem: Postoperative Nausea and Vomiting (Postpartum  Delivery)  Goal: Nausea and Vomiting Relief  Outcome: Met     Problem: Postoperative Urinary Retention (Postpartum  Delivery)  Goal: Effective Urinary Elimination  Outcome: Met     Problem: Breastfeeding  Goal: Effective Breastfeeding  Outcome: Met

## 2023-06-18 NOTE — PLAN OF CARE
Patient VSS, fundus is midline firm 2cm below umbilicus. Lochia is rubra scant amount. Patient voiding spontaneously and without difficulty. Patient reports passing gas, but has not had a BM, requested medication, senna administered per MAR. Pain has been controlled through the night via scheduled medications. Patient independently completing loren-care. Patient bonding well with infant. Significant other at bedside attentive to both patient and infant.

## 2023-06-19 LAB
FINAL PATHOLOGIC DIAGNOSIS: NORMAL
GROSS: NORMAL
Lab: NORMAL

## 2023-06-26 ENCOUNTER — HOSPITAL ENCOUNTER (INPATIENT)
Facility: HOSPITAL | Age: 28
LOS: 1 days | Discharge: HOME | DRG: 776 | End: 2023-06-27
Attending: OBSTETRICS & GYNECOLOGY | Admitting: OBSTETRICS & GYNECOLOGY
Payer: MEDICAID

## 2023-06-26 ENCOUNTER — HOSPITAL ENCOUNTER (EMERGENCY)
Facility: HOSPITAL | Age: 28
Discharge: ANOTHER HOSPITAL | End: 2023-06-26
Attending: EMERGENCY MEDICINE
Payer: MEDICAID

## 2023-06-26 VITALS
SYSTOLIC BLOOD PRESSURE: 147 MMHG | OXYGEN SATURATION: 100 % | TEMPERATURE: 98 F | RESPIRATION RATE: 15 BRPM | WEIGHT: 260 LBS | HEART RATE: 89 BPM | HEIGHT: 62 IN | BODY MASS INDEX: 47.84 KG/M2 | DIASTOLIC BLOOD PRESSURE: 81 MMHG

## 2023-06-26 DIAGNOSIS — Z98.891 S/P CESAREAN SECTION: Primary | ICD-10-CM

## 2023-06-26 LAB
ALBUMIN SERPL BCP-MCNC: 3.1 G/DL (ref 3.5–5.2)
ALP SERPL-CCNC: 113 U/L (ref 55–135)
ALT SERPL W/O P-5'-P-CCNC: 42 U/L (ref 10–44)
ANION GAP SERPL CALC-SCNC: 7 MMOL/L (ref 8–16)
AST SERPL-CCNC: 19 U/L (ref 10–40)
BACTERIA #/AREA URNS HPF: NEGATIVE /HPF
BASOPHILS # BLD AUTO: 0.04 K/UL (ref 0–0.2)
BASOPHILS # BLD AUTO: 0.05 K/UL (ref 0–0.2)
BASOPHILS NFR BLD: 0.3 % (ref 0–1.9)
BASOPHILS NFR BLD: 0.6 % (ref 0–1.9)
BILIRUB SERPL-MCNC: 0.3 MG/DL (ref 0.1–1)
BILIRUB UR QL STRIP: NEGATIVE
BUN SERPL-MCNC: 15 MG/DL (ref 6–20)
CALCIUM SERPL-MCNC: 8.9 MG/DL (ref 8.7–10.5)
CHLORIDE SERPL-SCNC: 110 MMOL/L (ref 95–110)
CLARITY UR: CLEAR
CO2 SERPL-SCNC: 25 MMOL/L (ref 23–29)
COLOR UR: YELLOW
CREAT SERPL-MCNC: 0.6 MG/DL (ref 0.5–1.4)
CREAT UR-MCNC: 25.9 MG/DL (ref 15–325)
DIFFERENTIAL METHOD: ABNORMAL
DIFFERENTIAL METHOD: NORMAL
EOSINOPHIL # BLD AUTO: 0.1 K/UL (ref 0–0.5)
EOSINOPHIL # BLD AUTO: 0.1 K/UL (ref 0–0.5)
EOSINOPHIL NFR BLD: 0.5 % (ref 0–8)
EOSINOPHIL NFR BLD: 1.4 % (ref 0–8)
ERYTHROCYTE [DISTWIDTH] IN BLOOD BY AUTOMATED COUNT: 13.3 % (ref 11.5–14.5)
ERYTHROCYTE [DISTWIDTH] IN BLOOD BY AUTOMATED COUNT: 13.7 % (ref 11.5–14.5)
EST. GFR  (NO RACE VARIABLE): >60 ML/MIN/1.73 M^2
GLUCOSE SERPL-MCNC: 87 MG/DL (ref 70–110)
GLUCOSE UR QL STRIP: NEGATIVE
HCT VFR BLD AUTO: 28.1 % (ref 37–48.5)
HCT VFR BLD AUTO: 37.6 % (ref 37–48.5)
HGB BLD-MCNC: 12.6 G/DL (ref 12–16)
HGB BLD-MCNC: 9.5 G/DL (ref 12–16)
HGB UR QL STRIP: NEGATIVE
HYALINE CASTS #/AREA URNS LPF: 0 /LPF
IMM GRANULOCYTES # BLD AUTO: 0.02 K/UL (ref 0–0.04)
IMM GRANULOCYTES # BLD AUTO: 0.06 K/UL (ref 0–0.04)
IMM GRANULOCYTES NFR BLD AUTO: 0.3 % (ref 0–0.5)
IMM GRANULOCYTES NFR BLD AUTO: 0.4 % (ref 0–0.5)
KETONES UR QL STRIP: NEGATIVE
LDH SERPL L TO P-CCNC: 242 U/L (ref 110–260)
LEUKOCYTE ESTERASE UR QL STRIP: ABNORMAL
LYMPHOCYTES # BLD AUTO: 1.6 K/UL (ref 1–4.8)
LYMPHOCYTES # BLD AUTO: 2.2 K/UL (ref 1–4.8)
LYMPHOCYTES NFR BLD: 32 % (ref 18–48)
LYMPHOCYTES NFR BLD: 9.7 % (ref 18–48)
MAGNESIUM SERPL-MCNC: 1.8 MG/DL (ref 1.6–2.6)
MCH RBC QN AUTO: 30.6 PG (ref 27–31)
MCH RBC QN AUTO: 30.8 PG (ref 27–31)
MCHC RBC AUTO-ENTMCNC: 33.5 G/DL (ref 32–36)
MCHC RBC AUTO-ENTMCNC: 33.8 G/DL (ref 32–36)
MCV RBC AUTO: 91 FL (ref 82–98)
MCV RBC AUTO: 91 FL (ref 82–98)
MICROSCOPIC COMMENT: ABNORMAL
MONOCYTES # BLD AUTO: 0.4 K/UL (ref 0.3–1)
MONOCYTES # BLD AUTO: 0.8 K/UL (ref 0.3–1)
MONOCYTES NFR BLD: 5 % (ref 4–15)
MONOCYTES NFR BLD: 5.5 % (ref 4–15)
NEUTROPHILS # BLD AUTO: 13.6 K/UL (ref 1.8–7.7)
NEUTROPHILS # BLD AUTO: 4.2 K/UL (ref 1.8–7.7)
NEUTROPHILS NFR BLD: 60.2 % (ref 38–73)
NEUTROPHILS NFR BLD: 84.1 % (ref 38–73)
NITRITE UR QL STRIP: NEGATIVE
NRBC BLD-RTO: 0 /100 WBC
NRBC BLD-RTO: 0 /100 WBC
PH UR STRIP: 7 [PH] (ref 5–8)
PHOSPHATE SERPL-MCNC: 3.8 MG/DL (ref 2.7–4.5)
PLATELET # BLD AUTO: 230 K/UL (ref 150–450)
PLATELET # BLD AUTO: 291 K/UL (ref 150–450)
PMV BLD AUTO: 10.3 FL (ref 9.2–12.9)
PMV BLD AUTO: 10.4 FL (ref 9.2–12.9)
POTASSIUM SERPL-SCNC: 4 MMOL/L (ref 3.5–5.1)
PROT SERPL-MCNC: 6.7 G/DL (ref 6–8.4)
PROT UR QL STRIP: NEGATIVE
PROT UR-MCNC: <5 MG/DL (ref 0–15)
PROT/CREAT UR: NORMAL MG/G{CREAT} (ref 0–0.2)
RBC # BLD AUTO: 3.08 M/UL (ref 4–5.4)
RBC # BLD AUTO: 4.12 M/UL (ref 4–5.4)
RBC #/AREA URNS HPF: 1 /HPF (ref 0–4)
SODIUM SERPL-SCNC: 142 MMOL/L (ref 136–145)
SP GR UR STRIP: 1.01 (ref 1–1.03)
SQUAMOUS #/AREA URNS HPF: 2 /HPF
URN SPEC COLLECT METH UR: ABNORMAL
UROBILINOGEN UR STRIP-ACNC: NEGATIVE EU/DL
WBC # BLD AUTO: 16.12 K/UL (ref 3.9–12.7)
WBC # BLD AUTO: 6.91 K/UL (ref 3.9–12.7)
WBC #/AREA URNS HPF: 1 /HPF (ref 0–5)

## 2023-06-26 PROCEDURE — 83735 ASSAY OF MAGNESIUM: CPT | Performed by: EMERGENCY MEDICINE

## 2023-06-26 PROCEDURE — 63600175 PHARM REV CODE 636 W HCPCS: Performed by: EMERGENCY MEDICINE

## 2023-06-26 PROCEDURE — 84156 ASSAY OF PROTEIN URINE: CPT | Performed by: EMERGENCY MEDICINE

## 2023-06-26 PROCEDURE — 85025 COMPLETE CBC W/AUTO DIFF WBC: CPT | Mod: 91 | Performed by: OBSTETRICS & GYNECOLOGY

## 2023-06-26 PROCEDURE — 25000003 PHARM REV CODE 250

## 2023-06-26 PROCEDURE — 11000001 HC ACUTE MED/SURG PRIVATE ROOM

## 2023-06-26 PROCEDURE — 25000003 PHARM REV CODE 250: Performed by: OBSTETRICS & GYNECOLOGY

## 2023-06-26 PROCEDURE — 36415 COLL VENOUS BLD VENIPUNCTURE: CPT | Performed by: OBSTETRICS & GYNECOLOGY

## 2023-06-26 PROCEDURE — 84100 ASSAY OF PHOSPHORUS: CPT | Performed by: EMERGENCY MEDICINE

## 2023-06-26 PROCEDURE — 63600175 PHARM REV CODE 636 W HCPCS: Performed by: OBSTETRICS & GYNECOLOGY

## 2023-06-26 PROCEDURE — 36415 COLL VENOUS BLD VENIPUNCTURE: CPT | Performed by: EMERGENCY MEDICINE

## 2023-06-26 PROCEDURE — 85025 COMPLETE CBC W/AUTO DIFF WBC: CPT | Performed by: EMERGENCY MEDICINE

## 2023-06-26 PROCEDURE — 99291 CRITICAL CARE FIRST HOUR: CPT

## 2023-06-26 PROCEDURE — 81000 URINALYSIS NONAUTO W/SCOPE: CPT | Performed by: EMERGENCY MEDICINE

## 2023-06-26 PROCEDURE — 96374 THER/PROPH/DIAG INJ IV PUSH: CPT

## 2023-06-26 PROCEDURE — 96375 TX/PRO/DX INJ NEW DRUG ADDON: CPT

## 2023-06-26 PROCEDURE — 25000003 PHARM REV CODE 250: Performed by: EMERGENCY MEDICINE

## 2023-06-26 PROCEDURE — 83615 LACTATE (LD) (LDH) ENZYME: CPT | Performed by: EMERGENCY MEDICINE

## 2023-06-26 PROCEDURE — 63600175 PHARM REV CODE 636 W HCPCS

## 2023-06-26 PROCEDURE — 80053 COMPREHEN METABOLIC PANEL: CPT | Performed by: EMERGENCY MEDICINE

## 2023-06-26 RX ORDER — MISOPROSTOL 200 UG/1
TABLET ORAL
Status: COMPLETED
Start: 2023-06-26 | End: 2023-06-26

## 2023-06-26 RX ORDER — HYDRALAZINE HYDROCHLORIDE 20 MG/ML
10 INJECTION INTRAMUSCULAR; INTRAVENOUS ONCE
Status: COMPLETED | OUTPATIENT
Start: 2023-06-26 | End: 2023-06-26

## 2023-06-26 RX ORDER — SODIUM CHLORIDE, SODIUM LACTATE, POTASSIUM CHLORIDE, CALCIUM CHLORIDE 600; 310; 30; 20 MG/100ML; MG/100ML; MG/100ML; MG/100ML
1000 INJECTION, SOLUTION INTRAVENOUS
Status: COMPLETED | OUTPATIENT
Start: 2023-06-26 | End: 2023-06-26

## 2023-06-26 RX ORDER — OXYTOCIN/RINGER'S LACTATE 30/500 ML
334 PLASTIC BAG, INJECTION (ML) INTRAVENOUS ONCE AS NEEDED
Status: COMPLETED | OUTPATIENT
Start: 2023-06-26 | End: 2023-06-26

## 2023-06-26 RX ORDER — ONDANSETRON 4 MG/1
8 TABLET, ORALLY DISINTEGRATING ORAL
Status: COMPLETED | OUTPATIENT
Start: 2023-06-26 | End: 2023-06-26

## 2023-06-26 RX ORDER — MISOPROSTOL 200 UG/1
1000 TABLET ORAL ONCE
Status: COMPLETED | OUTPATIENT
Start: 2023-06-26 | End: 2023-06-26

## 2023-06-26 RX ORDER — MAGNESIUM SULFATE HEPTAHYDRATE 40 MG/ML
4 INJECTION, SOLUTION INTRAVENOUS ONCE
Status: COMPLETED | OUTPATIENT
Start: 2023-06-26 | End: 2023-06-26

## 2023-06-26 RX ORDER — HYDROMORPHONE HYDROCHLORIDE 1 MG/ML
1 INJECTION, SOLUTION INTRAMUSCULAR; INTRAVENOUS; SUBCUTANEOUS
Status: COMPLETED | OUTPATIENT
Start: 2023-06-26 | End: 2023-06-26

## 2023-06-26 RX ORDER — HYDROCODONE BITARTRATE AND ACETAMINOPHEN 5; 325 MG/1; MG/1
1 TABLET ORAL EVERY 4 HOURS PRN
Status: DISCONTINUED | OUTPATIENT
Start: 2023-06-26 | End: 2023-06-27 | Stop reason: HOSPADM

## 2023-06-26 RX ORDER — IBUPROFEN 800 MG/1
800 TABLET ORAL EVERY 8 HOURS PRN
Status: DISCONTINUED | OUTPATIENT
Start: 2023-06-26 | End: 2023-06-27 | Stop reason: HOSPADM

## 2023-06-26 RX ORDER — SODIUM CHLORIDE, SODIUM LACTATE, POTASSIUM CHLORIDE, CALCIUM CHLORIDE 600; 310; 30; 20 MG/100ML; MG/100ML; MG/100ML; MG/100ML
INJECTION, SOLUTION INTRAVENOUS CONTINUOUS
Status: DISCONTINUED | OUTPATIENT
Start: 2023-06-26 | End: 2023-06-27

## 2023-06-26 RX ORDER — LABETALOL HYDROCHLORIDE 5 MG/ML
20 INJECTION, SOLUTION INTRAVENOUS ONCE
Status: DISCONTINUED | OUTPATIENT
Start: 2023-06-26 | End: 2023-06-26

## 2023-06-26 RX ORDER — OXYTOCIN/RINGER'S LACTATE 30/500 ML
PLASTIC BAG, INJECTION (ML) INTRAVENOUS
Status: COMPLETED
Start: 2023-06-26 | End: 2023-06-26

## 2023-06-26 RX ADMIN — SODIUM CHLORIDE, POTASSIUM CHLORIDE, SODIUM LACTATE AND CALCIUM CHLORIDE 1000 ML: 600; 310; 30; 20 INJECTION, SOLUTION INTRAVENOUS at 04:06

## 2023-06-26 RX ADMIN — MISOPROSTOL 1000 MCG: 200 TABLET ORAL at 09:06

## 2023-06-26 RX ADMIN — MAGNESIUM SULFATE HEPTAHYDRATE 4 G: 40 INJECTION, SOLUTION INTRAVENOUS at 04:06

## 2023-06-26 RX ADMIN — SODIUM CHLORIDE, POTASSIUM CHLORIDE, SODIUM LACTATE AND CALCIUM CHLORIDE: 600; 310; 30; 20 INJECTION, SOLUTION INTRAVENOUS at 09:06

## 2023-06-26 RX ADMIN — Medication 999 MILLI-UNITS/MIN: at 09:06

## 2023-06-26 RX ADMIN — ONDANSETRON 8 MG: 4 TABLET, ORALLY DISINTEGRATING ORAL at 05:06

## 2023-06-26 RX ADMIN — HYDROCODONE BITARTRATE AND ACETAMINOPHEN 1 TABLET: 5; 325 TABLET ORAL at 10:06

## 2023-06-26 RX ADMIN — HYDRALAZINE HYDROCHLORIDE 10 MG: 20 INJECTION, SOLUTION INTRAMUSCULAR; INTRAVENOUS at 04:06

## 2023-06-26 RX ADMIN — HYDROMORPHONE HYDROCHLORIDE 1 MG: 1 INJECTION, SOLUTION INTRAMUSCULAR; INTRAVENOUS; SUBCUTANEOUS at 05:06

## 2023-06-26 NOTE — ED PROVIDER NOTES
Encounter Date: 2023       History     Chief Complaint   Patient presents with    Postpartum Complications     Pt stated that she had  on 23 at [place] - while taking shower today noted to have heavy bright red vaginal bleeding.      28-year-old female  at [facility] by Dr. Matthews presents here with vaginal bleeding that began today while taking a shower.  Was also noted to be hypertensive at 170/93, repeat 187/93.  Some lower leg edema as well.  Denies any weakness.  Denies any seizure activity.  According to Dr. Matthews, blood pressure was slightly elevated 8 days ago after .    Review of patient's allergies indicates:  No Known Allergies  Past Medical History:   Diagnosis Date    Diabetes mellitus     Unspecified pre-eclampsia, unspecified trimester      Past Surgical History:   Procedure Laterality Date     SECTION       SECTION WITH TUBAL LIGATION N/A 2023    Procedure: REPEAT  SECTION, WITH BILATERAL TUBAL LIGATION;  Surgeon: Giuliana Matthews MD;  Location: [facility];  Service: OB/GYN;  Laterality: N/A;     Family History   Problem Relation Age of Onset    Hypertension Mother     Diabetes Mother     Hypertension Sister     Diabetes Sister      Social History     Tobacco Use    Smoking status: Never    Smokeless tobacco: Never   Substance Use Topics    Alcohol use: Not Currently    Drug use: Never     Review of Systems   Constitutional:  Negative for fever.   HENT:  Negative for sore throat.    Respiratory:  Negative for shortness of breath.    Cardiovascular:  Negative for chest pain.   Gastrointestinal:  Negative for nausea.   Genitourinary:  Positive for vaginal bleeding. Negative for dysuria.   Musculoskeletal:  Negative for back pain.   Skin:  Negative for rash.   Neurological:  Negative for weakness.   Hematological:  Does not bruise/bleed easily.   All other systems reviewed and are negative.    Physical Exam     Initial Vitals [23 1554]   BP  Pulse Resp Temp SpO2   (!) 170/93 (!) 56 18 98.4 °F (36.9 °C) 99 %      MAP       --         Physical Exam    Nursing note and vitals reviewed.  Constitutional: She appears well-developed and well-nourished. She is not diaphoretic. No distress.   HENT:   Head: Normocephalic and atraumatic.   Mouth/Throat: Oropharynx is clear and moist.   Eyes: Conjunctivae and EOM are normal. Pupils are equal, round, and reactive to light.   Neck: Neck supple. No tracheal deviation present. No JVD present.   Normal range of motion.  Cardiovascular:  Normal rate, regular rhythm, normal heart sounds and intact distal pulses.           No murmur heard.  Pulmonary/Chest: Breath sounds normal. No stridor. No respiratory distress. She has no wheezes. She has no rhonchi. She has no rales. She exhibits no tenderness.   Abdominal: Abdomen is soft. Bowel sounds are normal. She exhibits no distension. There is abdominal tenderness. There is no rebound and no guarding.   Genitourinary:    Genitourinary Comments: Vaginal bleeding noted to be moderate     Musculoskeletal:         General: Edema present. No tenderness. Normal range of motion.      Cervical back: Normal range of motion and neck supple.     Neurological: She is alert and oriented to person, place, and time. She has normal strength. No cranial nerve deficit. GCS score is 15. GCS eye subscore is 4. GCS verbal subscore is 5. GCS motor subscore is 6.   Skin: Skin is warm and dry. Capillary refill takes less than 2 seconds.       ED Course   Critical Care    Date/Time: 6/26/2023 5:05 PM  Performed by: Braxton Stevens MD  Authorized by: Braxton Stevens MD   Direct patient critical care time: 10 minutes  Additional history critical care time: 10 minutes  Ordering / reviewing critical care time: 10 minutes  Documentation critical care time: 10 minutes  Consulting other physicians critical care time: 10 minutes  Consult with family critical care time: 10 minutes  Total critical care time  (exclusive of procedural time) : 60 minutes  Critical care was necessary to treat or prevent imminent or life-threatening deterioration of the following conditions: Postpartum preeclampsia.  Critical care was time spent personally by me on the following activities: review of old charts, re-evaluation of patient's condition, pulse oximetry, ordering and review of laboratory studies, ordering and performing treatments and interventions, obtaining history from patient or surrogate, examination of patient, evaluation of patient's response to treatment, discussions with consultants, discussions with primary provider and development of treatment plan with patient or surrogate.      Labs Reviewed   COMPREHENSIVE METABOLIC PANEL - Abnormal; Notable for the following components:       Result Value    Albumin 3.1 (*)     Anion Gap 7 (*)     All other components within normal limits   URINALYSIS, REFLEX TO URINE CULTURE - Abnormal; Notable for the following components:    Leukocytes, UA Trace (*)     All other components within normal limits    Narrative:     Preferred Collection Type->Urine, Clean Catch  Specimen Source->Urine   URINALYSIS MICROSCOPIC - Abnormal; Notable for the following components:    Hyaline Casts, UA 0.0 (*)     All other components within normal limits    Narrative:     Preferred Collection Type->Urine, Clean Catch  Specimen Source->Urine   PROTEIN / CREATININE RATIO, URINE    Narrative:     Preferred Collection Type->Urine, Clean Catch  Specimen Source->Urine   LACTATE DEHYDROGENASE   MAGNESIUM   PHOSPHORUS   CBC W/ AUTO DIFFERENTIAL          Imaging Results    None          Medications   magnesium sulfate in water 40 gram/1,000 mL (4 %) bolus from bag 4 g 100 mL (4 g Intravenous Bolus from Bag 6/26/23 1628)   hydrALAZINE injection 10 mg (10 mg Intravenous Given 6/26/23 1608)   lactated ringers infusion (1,000 mLs Intravenous New Bag 6/26/23 1628)     Medical Decision Making:   Differential Diagnosis:    Preeclampsia, vaginal bleeding after   ED Management:  Discussed case with Dr. Matthews, recommends preeclamptic workup, treatment as well as transfer to [place]                        Clinical Impression:   Final diagnoses:  [O14.95] Preeclampsia in postpartum period (Primary)        ED Disposition Condition    Transfer to Another Facility Stable                Braxton Stevens MD  23 0149

## 2023-06-26 NOTE — ED NOTES
Spoke with Jamel from the transfer center, pt going to L&D # for report is [phone], pt going routine ground

## 2023-06-27 VITALS
TEMPERATURE: 98 F | HEIGHT: 62 IN | DIASTOLIC BLOOD PRESSURE: 88 MMHG | RESPIRATION RATE: 18 BRPM | HEART RATE: 96 BPM | OXYGEN SATURATION: 99 % | WEIGHT: 260 LBS | SYSTOLIC BLOOD PRESSURE: 136 MMHG | BODY MASS INDEX: 47.84 KG/M2

## 2023-06-27 LAB
BASOPHILS # BLD AUTO: 0.02 K/UL (ref 0–0.2)
BASOPHILS # BLD AUTO: 0.03 K/UL (ref 0–0.2)
BASOPHILS NFR BLD: 0.3 % (ref 0–1.9)
BASOPHILS NFR BLD: 0.3 % (ref 0–1.9)
DIFFERENTIAL METHOD: ABNORMAL
DIFFERENTIAL METHOD: ABNORMAL
EOSINOPHIL # BLD AUTO: 0 K/UL (ref 0–0.5)
EOSINOPHIL # BLD AUTO: 0.1 K/UL (ref 0–0.5)
EOSINOPHIL NFR BLD: 0.3 % (ref 0–8)
EOSINOPHIL NFR BLD: 0.9 % (ref 0–8)
ERYTHROCYTE [DISTWIDTH] IN BLOOD BY AUTOMATED COUNT: 13.6 % (ref 11.5–14.5)
ERYTHROCYTE [DISTWIDTH] IN BLOOD BY AUTOMATED COUNT: 13.7 % (ref 11.5–14.5)
HCT VFR BLD AUTO: 24.6 % (ref 37–48.5)
HCT VFR BLD AUTO: 25.2 % (ref 37–48.5)
HGB BLD-MCNC: 8.1 G/DL (ref 12–16)
HGB BLD-MCNC: 8.4 G/DL (ref 12–16)
IMM GRANULOCYTES # BLD AUTO: 0.02 K/UL (ref 0–0.04)
IMM GRANULOCYTES # BLD AUTO: 0.04 K/UL (ref 0–0.04)
IMM GRANULOCYTES NFR BLD AUTO: 0.3 % (ref 0–0.5)
IMM GRANULOCYTES NFR BLD AUTO: 0.4 % (ref 0–0.5)
LYMPHOCYTES # BLD AUTO: 1.5 K/UL (ref 1–4.8)
LYMPHOCYTES # BLD AUTO: 1.6 K/UL (ref 1–4.8)
LYMPHOCYTES NFR BLD: 15.3 % (ref 18–48)
LYMPHOCYTES NFR BLD: 21 % (ref 18–48)
MCH RBC QN AUTO: 30.2 PG (ref 27–31)
MCH RBC QN AUTO: 30.5 PG (ref 27–31)
MCHC RBC AUTO-ENTMCNC: 32.9 G/DL (ref 32–36)
MCHC RBC AUTO-ENTMCNC: 33.3 G/DL (ref 32–36)
MCV RBC AUTO: 91 FL (ref 82–98)
MCV RBC AUTO: 93 FL (ref 82–98)
MONOCYTES # BLD AUTO: 0.5 K/UL (ref 0.3–1)
MONOCYTES # BLD AUTO: 0.5 K/UL (ref 0.3–1)
MONOCYTES NFR BLD: 5.1 % (ref 4–15)
MONOCYTES NFR BLD: 6.9 % (ref 4–15)
NEUTROPHILS # BLD AUTO: 5.4 K/UL (ref 1.8–7.7)
NEUTROPHILS # BLD AUTO: 7.5 K/UL (ref 1.8–7.7)
NEUTROPHILS NFR BLD: 70.6 % (ref 38–73)
NEUTROPHILS NFR BLD: 78.6 % (ref 38–73)
NRBC BLD-RTO: 0 /100 WBC
NRBC BLD-RTO: 0 /100 WBC
PLATELET # BLD AUTO: 199 K/UL (ref 150–450)
PLATELET # BLD AUTO: 210 K/UL (ref 150–450)
PMV BLD AUTO: 10.3 FL (ref 9.2–12.9)
PMV BLD AUTO: 10.4 FL (ref 9.2–12.9)
RBC # BLD AUTO: 2.66 M/UL (ref 4–5.4)
RBC # BLD AUTO: 2.78 M/UL (ref 4–5.4)
WBC # BLD AUTO: 7.57 K/UL (ref 3.9–12.7)
WBC # BLD AUTO: 9.56 K/UL (ref 3.9–12.7)

## 2023-06-27 PROCEDURE — 63600175 PHARM REV CODE 636 W HCPCS: Performed by: OBSTETRICS & GYNECOLOGY

## 2023-06-27 PROCEDURE — 85025 COMPLETE CBC W/AUTO DIFF WBC: CPT | Mod: 91 | Performed by: OBSTETRICS & GYNECOLOGY

## 2023-06-27 PROCEDURE — 25000003 PHARM REV CODE 250: Performed by: OBSTETRICS & GYNECOLOGY

## 2023-06-27 PROCEDURE — 99221 PR INITIAL HOSPITAL CARE,LEVL I: ICD-10-PCS | Mod: ,,, | Performed by: OBSTETRICS & GYNECOLOGY

## 2023-06-27 PROCEDURE — 99221 1ST HOSP IP/OBS SF/LOW 40: CPT | Mod: ,,, | Performed by: OBSTETRICS & GYNECOLOGY

## 2023-06-27 PROCEDURE — 36415 COLL VENOUS BLD VENIPUNCTURE: CPT | Performed by: OBSTETRICS & GYNECOLOGY

## 2023-06-27 RX ORDER — SODIUM CHLORIDE 0.9 % (FLUSH) 0.9 %
10 SYRINGE (ML) INJECTION
Status: DISCONTINUED | OUTPATIENT
Start: 2023-06-27 | End: 2023-06-27 | Stop reason: HOSPADM

## 2023-06-27 RX ORDER — ONDANSETRON 8 MG/1
8 TABLET, ORALLY DISINTEGRATING ORAL EVERY 8 HOURS PRN
Status: DISCONTINUED | OUTPATIENT
Start: 2023-06-27 | End: 2023-06-27 | Stop reason: HOSPADM

## 2023-06-27 RX ADMIN — IRON SUCROSE 200 MG: 20 INJECTION, SOLUTION INTRAVENOUS at 08:06

## 2023-06-27 RX ADMIN — SODIUM CHLORIDE, POTASSIUM CHLORIDE, SODIUM LACTATE AND CALCIUM CHLORIDE: 600; 310; 30; 20 INJECTION, SOLUTION INTRAVENOUS at 01:06

## 2023-06-27 RX ADMIN — IBUPROFEN 800 MG: 800 TABLET, FILM COATED ORAL at 06:06

## 2023-06-27 NOTE — NURSING
MD called and notified of CBC results.  Reported as well that bleeding has been light and that pt is reporting pain as a 0.  No new orders given.

## 2023-06-27 NOTE — HOSPITAL COURSE
HD#1 Pt was initially evaluated in [facility] ER and diagnosed with postpartum pre-eclampsia there.  She received a 4g Magnesium sulfate bolus there and one dose of Hydralazine.  Pre-e labs there were negative. She arrived to [facility] L&D about 4 hours later and was noted to be bleeding heavily with passage of clots.  She received cytotec 1000 mcg per rectum and IV pitocin bolus.  Bleeding became light after treatment.  Maintenance Magnesium was not initiated in the ER at [facility] and not started at [facility] secondary to bleeding and hypotension on admission.  HD#2 Bleeding remained light overnight.  Patient now asymptomatic.  No pain. BP normotensive with no treatment.  Serial CBC since admission.  Will give dose of IV iron.

## 2023-06-27 NOTE — DISCHARGE SUMMARY
[facility] - Labor & Delivery  Obstetrics & Gynecology  Discharge Summary    Patient Name: Shawna Oseguera  MRN: [id number]  Admission Date: 2023  Hospital Length of Stay: 1 days  Discharge Date and Time:  2023 1:41 PM  Attending Physician: Jose Manuel Solis MD   Discharging Provider: Jose Manuel Solis MD  Primary Care Provider: Giuliana Matthews MD    HPI:  Pt is a  s/p RLTCS with BTL on 23.  She presented to [place] ER yesterday with complaints of acute onset heavy bleeding in the shower with passing clots.  On arrival to the ER she was noted to have severe range BP.  Patient reported intermittent headaches.  Complained of severe lower abdominal pain and cramping. No vision changes.        Hospital Course:  HD#1 Pt was initially evaluated in [place] ER and diagnosed with postpartum pre-eclampsia there.  She received a 4g Magnesium sulfate bolus there and one dose of Hydralazine.  Pre-e labs there were negative. She arrived to [facility] L[facility]D about 4 hours later and was noted to be bleeding heavily with passage of clots.  She received cytotec 1000 mcg per rectum and IV pitocin bolus.  Bleeding became light after treatment.  Maintenance Magnesium was not initiated in the ER at [facility] and not started at [facility] secondary to bleeding and hypotension on admission.  HD#2 Bleeding remained light overnight.  Patient now asymptomatic.  No pain. BP normotensive with no treatment.  Serial CBC since admission.  Will give dose of IV iron.      Goals of Care Treatment Preferences:  Code Status: Full Code      * No surgery found *         Significant Diagnostic Studies: N/A      Pending Diagnostic Studies:     None        Final Active Diagnoses:    Diagnosis Date Noted POA    PRINCIPAL PROBLEM:  Delayed postpartum hemorrhage [O72.2] 2023 Yes    Preeclampsia in postpartum period [O14.95] 2023 Yes      Problems Resolved During this Admission:        Discharged Condition:  good    Disposition: Home or Self Care    Follow Up:   Follow-up Information     Giuliana Matthews MD Follow up in 2 day(s).    Specialty: Obstetrics and Gynecology  Contact information:  [facility] [facility]  [address] [address]  [phone]                       Patient Instructions:      Diet Adult Regular     Lifting restrictions   Order Comments: No lifting greater than 15 pounds     Pelvic Rest     Notify your health care provider if you experience any of the following:  temperature >100.4     Notify your health care provider if you experience any of the following:  persistent nausea and vomiting or diarrhea     Notify your health care provider if you experience any of the following:  severe uncontrolled pain     Notify your health care provider if you experience any of the following:  redness, tenderness, or signs of infection (pain, swelling, redness, odor or green/yellow discharge around incision site)     Notify your health care provider if you experience any of the following:  difficulty breathing or increased cough     Notify your health care provider if you experience any of the following:  severe persistent headache     Notify your health care provider if you experience any of the following:  worsening rash     Notify your health care provider if you experience any of the following:  persistent dizziness, light-headedness, or visual disturbances     Notify your health care provider if you experience any of the following:  increased confusion or weakness     Notify your health care provider if you experience any of the following:   Order Comments: Heavy vaginal bleeding (saturating 2 pads in 1 hour)     Activity as tolerated     Medications:  Reconciled Home Medications:      Medication List      Continue taking these medications    ibuprofen 800 MG tablet  Commonly known as: ADVIL,MOTRIN  Take 1 tablet (800 mg total) by mouth every 8 (eight) hours as needed for Pain.     oxyCODONE 5 MG  immediate release tablet  Commonly known as: ROXICODONE  Take 1 tablet (5 mg total) by mouth every 6 (six) hours as needed for Pain.     PRENATAL 1+1 ORAL  Take by mouth.            Jose Manuel Solis MD  Obstetrics & Gynecology  [facility] - Labor & Delivery

## 2023-06-27 NOTE — NURSING
D/c instructions given to pt and S.O.  voices understandings  PT will keep scheduled appt with Dr. Matthews this week   VS WNL  lochia is light    no needs at this time   Pt D/C off floor via w/c with staff and fly at side

## 2023-06-27 NOTE — NURSING
Dr. Solis here to assess pt.   Abd dressing removed by Dr. Solis.  [place] removed.  No s/s of infections noted   no drainage noted.  Incision cleaned and dried.  Pt tolerated everything well.

## 2023-06-27 NOTE — NURSING
RN at bedside.  Bleeding checked.  Noted to be light to moderate in 1 hour.  No clots noted.  No active bleeding noted. Pt states pain is 3/10 on the pain scale.  Pt states she needs to void.  Ambulated to bathroom with 2 RNs present.  Pt voided.  Missed potty hat (did instruct patient to sit close to front of the toilet).  Pt after voiding went into shower. Peripads changed after shower.  Linen changed.      Breast pump provided to patient to pump as patient is breastfeeding and infant is not available.

## 2023-06-27 NOTE — NURSING
Pt rounds.  Pt still sleeping.  Vaginal bleeding checked.  Bleeding WNL.  Got patient up and ambulated to bathroom.  Pt voided and peripads changed.  No clots noted.  Pt abdomen palpated once back in bed.  Soft to palpation. Pt stated that where she was hurting before with palpation (upper abdomen), she is not hurting any more.  Pain complaints more in vaginal area.  Medication offered but refused.  Pt  states pain is a 3/10 on the pain scale.  Offered to help set up breastpump again.  Pt agreed.  Pt now pumping.  Vitals have been stable. Will continue to monitor patient.

## 2023-06-27 NOTE — NURSING
Pt rounds completed.  Bleeding checked and light on peripad.  Pt states that she would like to get up and ambulate.  Pt is not c/o any dizziness, nausea, lightheadedness so pt up and ambulating.  Pt done with pumping.  Breastmilk collected and placed in patient breastmilk fridge.  Pump parts cleaned and place to air dry on counter.  Pt does not have any other needs.

## 2023-06-27 NOTE — SUBJECTIVE & OBJECTIVE
OB History    Para Term  AB Living   3 3 3 0 0 3   SAB IAB Ectopic Multiple Live Births   0 0 0 0 3      # Outcome Date GA Lbr Colt/2nd Weight Sex Delivery Anes PTL Lv   3 Term 23 37w2d  3.657 kg (8 lb 1 oz) M CS-LTranv Spinal N CAT      Name: FADIA SANCHES      Apgar1: 8  Apgar5: 9   2 Term  38w0d  3.204 kg (7 lb 1 oz) M CS-LTranv   CAT      Birth Comments: GHTN   1 Term  37w0d  2.693 kg (5 lb 15 oz) M CS-Unspec   CAT      Birth Comments: oligo, GHTN     Past Medical History:   Diagnosis Date    Diabetes mellitus     Unspecified pre-eclampsia, unspecified trimester      Past Surgical History:   Procedure Laterality Date     SECTION       SECTION WITH TUBAL LIGATION N/A 2023    Procedure: REPEAT  SECTION, WITH BILATERAL TUBAL LIGATION;  Surgeon: Giuliana Matthews MD;  Location: [facility];  Service: OB/GYN;  Laterality: N/A;       PTA Medications   Medication Sig    ibuprofen (ADVIL,MOTRIN) 800 MG tablet Take 1 tablet (800 mg total) by mouth every 8 (eight) hours as needed for Pain.    oxyCODONE (ROXICODONE) 5 MG immediate release tablet Take 1 tablet (5 mg total) by mouth every 6 (six) hours as needed for Pain.    prenatal vit/iron fum/folic ac (PRENATAL 1+1 ORAL) Take by mouth.       Review of patient's allergies indicates:  No Known Allergies     Family History       Problem Relation (Age of Onset)    Diabetes Mother, Sister    Hypertension Mother, Sister          Tobacco Use    Smoking status: Never    Smokeless tobacco: Never   Substance and Sexual Activity    Alcohol use: Not Currently    Drug use: Never    Sexual activity: Yes     Partners: Male     Review of Systems   Constitutional:  Negative for activity change, appetite change, chills, diaphoresis, fatigue and fever.   Eyes:  Negative for visual disturbance.   Respiratory:  Negative for cough, shortness of breath and wheezing.    Cardiovascular:  Negative for chest pain and palpitations.    Gastrointestinal:  Positive for abdominal pain. Negative for constipation, diarrhea, nausea and vomiting.   Genitourinary:  Positive for vaginal bleeding. Negative for dysuria, genital sores, pelvic pain, urgency, vaginal discharge, vaginal pain and vaginal odor.   Musculoskeletal:  Negative for back pain, joint swelling and myalgias.   Integumentary:  Negative for rash.   Neurological:  Negative for seizures, syncope, numbness and headaches.   Hematological:  Negative for adenopathy. Does not bruise/bleed easily.   Psychiatric/Behavioral:  Negative for depression. The patient is not nervous/anxious.     Objective:     Vital Signs (Most Recent):  Temp: 98.3 °F (36.8 °C) (06/27/23 0736)  Pulse: 89 (06/27/23 0802)  Resp: 18 (06/27/23 0736)  BP: 134/60 (06/27/23 0802)  SpO2: 99 % (06/27/23 0736) Vital Signs (24h Range):  Temp:  [98.3 °F (36.8 °C)-98.8 °F (37.1 °C)] 98.3 °F (36.8 °C)  Pulse:  [56-97] 89  Resp:  [15-23] 18  SpO2:  [96 %-100 %] 99 %  BP: ()/(50-93) 134/60     Weight: 117.9 kg (260 lb)  Body mass index is 47.55 kg/m².    No LMP recorded.     Physical Exam:   Constitutional: She is oriented to person, place, and time. She appears well-developed and well-nourished. No distress.    HENT:   Head: Normocephalic and atraumatic.    Eyes: Conjunctivae and EOM are normal.      Pulmonary/Chest: Effort normal.        Abdominal: Soft. She exhibits abdominal incision (Aquacel dressing removed; staples removed. Incision healing well). There is no abdominal tenderness. There is no rebound and no guarding.   Bruising bilateral abdomen at sites of prior Lovenox injections     Genitourinary:    Genitourinary Comments: Lochia minimal             Musculoskeletal: Normal range of motion. No tenderness.       Neurological: She is alert and oriented to person, place, and time. She has normal reflexes.        Laboratory:  Recent Lab Results         06/27/23  0338   06/26/23  2205   06/26/23  1638   06/26/23  1614         Albumin       3.1       Alkaline Phosphatase       113       ALT       42       Anion Gap       7       Appearance, UA     Clear         AST       19       Bacteria, UA     Negative         Baso # 0.03   0.05     0.04       Basophil % 0.3   0.3     0.6       Bilirubin (UA)     Negative         BILIRUBIN TOTAL       0.3  Comment: For infants and newborns, interpretation of results should be based  on gestational age, weight and in agreement with clinical  observations.    Premature Infant recommended reference ranges:  Up to 24 hours.............<8.0 mg/dL  Up to 48 hours............<12.0 mg/dL  3-5 days..................<15.0 mg/dL  6-29 days.................<15.0 mg/dL    For patients on Eltrombopag therapy, use of Dimension [place] TBIL is   not   recommended.         BUN       15       Calcium       8.9       Chloride       110       CO2       25       Color, UA     Yellow         Creatinine       0.6       Creatinine, Urine     25.9         Differential Method Automated   Automated     Automated       eGFR       >60.0       Eos # 0.0   0.1     0.1       Eosinophil % 0.3   0.5     1.4       Glucose       87       Glucose, UA     Negative         Gran # (ANC) 7.5   13.6     4.2       Gran % 78.6   84.1     60.2       Hematocrit 25.2   28.1     37.6       Hemoglobin 8.4   9.5     12.6       Hyaline Casts, UA     0.0         Immature Grans (Abs) 0.04  Comment: Mild elevation in immature granulocytes is non specific and   can be seen in a variety of conditions including stress response,   acute inflammation, trauma and pregnancy. Correlation with other   laboratory and clinical findings is essential.     0.06  Comment: Mild elevation in immature granulocytes is non specific and   can be seen in a variety of conditions including stress response,   acute inflammation, trauma and pregnancy. Correlation with other   laboratory and clinical findings is essential.       0.02  Comment: Mild elevation in immature granulocytes  is non specific and   can be seen in a variety of conditions including stress response,   acute inflammation, trauma and pregnancy. Correlation with other   laboratory and clinical findings is essential.         Immature Granulocytes 0.4   0.4     0.3       Ketones, UA     Negative         LD       242  Comment: Results are increased in hemolyzed samples.       Leukocytes, UA     Trace         Lymph # 1.5   1.6     2.2       Lymph % 15.3   9.7     32.0       Magnesium       1.8       MCH 30.2   30.8     30.6       MCHC 33.3   33.8     33.5       MCV 91   91     91       Microscopic Comment     SEE COMMENT  Comment: Other formed elements not mentioned in the report are not   present in the microscopic examination.            Mono # 0.5   0.8     0.4       Mono % 5.1   5.0     5.5       MPV 10.3   10.3     10.4       NITRITE UA     Negative         nRBC 0   0     0       Occult Blood UA     Negative         pH, UA     7.0         Phosphorus       3.8       Platelets 199   230     291       Potassium       4.0       Prot/Creat Ratio, Urine     Unable to calculate         PROTEIN TOTAL       6.7       Protein, UA     Negative  Comment: Recommend a 24 hour urine protein or a urine   protein/creatinine ratio if globulin induced proteinuria is  clinically suspected.           Protein, Urine Random     <5.0         RBC 2.78   3.08     4.12       RBC, UA     1         RDW 13.6   13.7     13.3       Sodium       142       Specific [place], UA     1.010         Specimen UA     Urine, Catheterized         Squam Epithel, UA     2         UROBILINOGEN UA     Negative         WBC, UA     1         WBC 9.56   16.12     6.91

## 2023-06-27 NOTE — NURSING
MD called and reported that patient is c/o pain and rating it a 5/10 on the pain scale.  New orders given.

## 2023-06-27 NOTE — NURSING
[facility] called.  Notified of patient arrival.  Upon arrival and transfer to bed in room, patient had a large gush of blood from her vagina.  Upon assessment blood was noted down leg and mesh panties were saturated.  Once mesh panties were removed large clots were noted.  Fundal massage attempted and patient guarding due to pain.  Active bleeding noted once pads were changed.  Reported vitals and that LR bolus was initiated upon arrival due to low BP. Pain with palpation of abdomen.  Firmness felt above umbilicus.  Bruising noted on abdomen but patient did receive lovenox shots during PP period in hospital stay. New orders given per MD.     Pt peripads changed at 2130 after insertion of rectal cytotec.

## 2023-06-27 NOTE — H&P
[facility] - Labor & Delivery  Obstetrics & Gynecology  History & Physical    Patient Name: Shawna Oseguera  MRN: [id number]  Admission Date: 2023  Primary Care Provider: Giuliana Matthews MD    Subjective:     Chief Complaint/Reason for Admission: Postpartum hemorrhage with concern for possible pre-eclampsia    History of Present Illness:  Pt is a  s/p RLTCS with BTL on 23.  She presented to [facility] yesterday with complaints of acute onset heavy bleeding in the shower with passing clots.  On arrival to the ER she was noted to have severe range BP.  Patient reported intermittent headaches.  Complained of severe lower abdominal pain and cramping. No vision changes.            OB History    Para Term  AB Living   3 3 3 0 0 3   SAB IAB Ectopic Multiple Live Births   0 0 0 0 3      # Outcome Date GA Lbr Colt/2nd Weight Sex Delivery Anes PTL Lv   3 Term 23 37w2d  3.657 kg (8 lb 1 oz) M CS-LTranv Spinal N CAT      Name: FADIA SANCHES      Apgar1: 8  Apgar5: 9   2 Term  38w0d  3.204 kg (7 lb 1 oz) M CS-LTranv   CAT      Birth Comments: GHTN   1 Term  37w0d  2.693 kg (5 lb 15 oz) M CS-Unspec   CAT      Birth Comments: oligo, GHTN     Past Medical History:   Diagnosis Date    Diabetes mellitus     Unspecified pre-eclampsia, unspecified trimester      Past Surgical History:   Procedure Laterality Date     SECTION       SECTION WITH TUBAL LIGATION N/A 2023    Procedure: REPEAT  SECTION, WITH BILATERAL TUBAL LIGATION;  Surgeon: Giuliana Matthews MD;  Location: [facility];  Service: OB/GYN;  Laterality: N/A;       PTA Medications   Medication Sig    ibuprofen (ADVIL,MOTRIN) 800 MG tablet Take 1 tablet (800 mg total) by mouth every 8 (eight) hours as needed for Pain.    oxyCODONE (ROXICODONE) 5 MG immediate release tablet Take 1 tablet (5 mg total) by mouth every 6 (six) hours as needed for Pain.    prenatal vit/iron fum/folic ac (PRENATAL  1+1 ORAL) Take by mouth.       Review of patient's allergies indicates:  No Known Allergies     Family History       Problem Relation (Age of Onset)    Diabetes Mother, Sister    Hypertension Mother, Sister          Tobacco Use    Smoking status: Never    Smokeless tobacco: Never   Substance and Sexual Activity    Alcohol use: Not Currently    Drug use: Never    Sexual activity: Yes     Partners: Male     Review of Systems   Constitutional:  Negative for activity change, appetite change, chills, diaphoresis, fatigue and fever.   Eyes:  Negative for visual disturbance.   Respiratory:  Negative for cough, shortness of breath and wheezing.    Cardiovascular:  Negative for chest pain and palpitations.   Gastrointestinal:  Positive for abdominal pain. Negative for constipation, diarrhea, nausea and vomiting.   Genitourinary:  Positive for vaginal bleeding. Negative for dysuria, genital sores, pelvic pain, urgency, vaginal discharge, vaginal pain and vaginal odor.   Musculoskeletal:  Negative for back pain, joint swelling and myalgias.   Integumentary:  Negative for rash.   Neurological:  Negative for seizures, syncope, numbness and headaches.   Hematological:  Negative for adenopathy. Does not bruise/bleed easily.   Psychiatric/Behavioral:  Negative for depression. The patient is not nervous/anxious.     Objective:     Vital Signs (Most Recent):  Temp: 98.3 °F (36.8 °C) (06/27/23 0736)  Pulse: 89 (06/27/23 0802)  Resp: 18 (06/27/23 0736)  BP: 134/60 (06/27/23 0802)  SpO2: 99 % (06/27/23 0736) Vital Signs (24h Range):  Temp:  [98.3 °F (36.8 °C)-98.8 °F (37.1 °C)] 98.3 °F (36.8 °C)  Pulse:  [56-97] 89  Resp:  [15-23] 18  SpO2:  [96 %-100 %] 99 %  BP: ()/(50-93) 134/60     Weight: 117.9 kg (260 lb)  Body mass index is 47.55 kg/m².    No LMP recorded.     Physical Exam:   Constitutional: She is oriented to person, place, and time. She appears well-developed and well-nourished. No distress.    HENT:   Head:  Normocephalic and atraumatic.    Eyes: Conjunctivae and EOM are normal.      Pulmonary/Chest: Effort normal.        Abdominal: Soft. She exhibits abdominal incision (Aquacel dressing removed; staples removed. Incision healing well). There is no abdominal tenderness. There is no rebound and no guarding.   Bruising bilateral abdomen at sites of prior Lovenox injections     Genitourinary:    Genitourinary Comments: Lochia minimal             Musculoskeletal: Normal range of motion. No tenderness.       Neurological: She is alert and oriented to person, place, and time. She has normal reflexes.        Laboratory:  Recent Lab Results         06/27/23  0338   06/26/23  2205   06/26/23  1638   06/26/23  1614        Albumin       3.1       Alkaline Phosphatase       113       ALT       42       Anion Gap       7       Appearance, UA     Clear         AST       19       Bacteria, UA     Negative         Baso # 0.03   0.05     0.04       Basophil % 0.3   0.3     0.6       Bilirubin (UA)     Negative         BILIRUBIN TOTAL       0.3  Comment: For infants and newborns, interpretation of results should be based  on gestational age, weight and in agreement with clinical  observations.    Premature Infant recommended reference ranges:  Up to 24 hours.............<8.0 mg/dL  Up to 48 hours............<12.0 mg/dL  3-5 days..................<15.0 mg/dL  6-29 days.................<15.0 mg/dL    For patients on Eltrombopag therapy, use of Dimension [place] TBIL is   not   recommended.         BUN       15       Calcium       8.9       Chloride       110       CO2       25       Color, UA     Yellow         Creatinine       0.6       Creatinine, Urine     25.9         Differential Method Automated   Automated     Automated       eGFR       >60.0       Eos # 0.0   0.1     0.1       Eosinophil % 0.3   0.5     1.4       Glucose       87       Glucose, UA     Negative         Gran # (ANC) 7.5   13.6     4.2       Gran % 78.6   84.1      60.2       Hematocrit 25.2   28.1     37.6       Hemoglobin 8.4   9.5     12.6       Hyaline Casts, UA     0.0         Immature Grans (Abs) 0.04  Comment: Mild elevation in immature granulocytes is non specific and   can be seen in a variety of conditions including stress response,   acute inflammation, trauma and pregnancy. Correlation with other   laboratory and clinical findings is essential.     0.06  Comment: Mild elevation in immature granulocytes is non specific and   can be seen in a variety of conditions including stress response,   acute inflammation, trauma and pregnancy. Correlation with other   laboratory and clinical findings is essential.       0.02  Comment: Mild elevation in immature granulocytes is non specific and   can be seen in a variety of conditions including stress response,   acute inflammation, trauma and pregnancy. Correlation with other   laboratory and clinical findings is essential.         Immature Granulocytes 0.4   0.4     0.3       Ketones, UA     Negative         LD       242  Comment: Results are increased in hemolyzed samples.       Leukocytes, UA     Trace         Lymph # 1.5   1.6     2.2       Lymph % 15.3   9.7     32.0       Magnesium       1.8       MCH 30.2   30.8     30.6       MCHC 33.3   33.8     33.5       MCV 91   91     91       Microscopic Comment     SEE COMMENT  Comment: Other formed elements not mentioned in the report are not   present in the microscopic examination.            Mono # 0.5   0.8     0.4       Mono % 5.1   5.0     5.5       MPV 10.3   10.3     10.4       NITRITE UA     Negative         nRBC 0   0     0       Occult Blood UA     Negative         pH, UA     7.0         Phosphorus       3.8       Platelets 199   230     291       Potassium       4.0       Prot/Creat Ratio, Urine     Unable to calculate         PROTEIN TOTAL       6.7       Protein, UA     Negative  Comment: Recommend a 24 hour urine protein or a urine   protein/creatinine ratio if  globulin induced proteinuria is  clinically suspected.           Protein, Urine Random     <5.0         RBC 2.78   3.08     4.12       RBC, UA     1         RDW 13.6   13.7     13.3       Sodium       142       Specific [place], UA     1.010         Specimen UA     Urine, Catheterized         Squam Epithel, UA     2         UROBILINOGEN UA     Negative         WBC, UA     1         WBC 9.56   16.12     6.91                 Assessment/Plan:     Obstetric  * Delayed postpartum hemorrhage  S/p cytotec and Pitocin with improvement in bleeding.    Initial drop in H/H on admission; follow up 6 hours later was reassuring.   IV iron this am.  Repeat CBC at 11 am.    If bleeding remains controlled, will consider discharge home this afternoon.    Preeclampsia in postpartum period  Patient s/p 4g Magnesium sulfate bolus in ER at [facility]  Maintenance Magnesium sulfate not initiated there and decision made not to start here secondary to postpartum hemorrhage and hypotension.   Pre-e labs normal.   Asymptomatic.  Monitor BP        Jose Manuel Solis MD  Obstetrics & Gynecology  [facility] - Labor & Delivery

## 2023-06-27 NOTE — HPI
Pt is a  s/p RLTCS with BTL on 23.  She presented to [facility] ER yesterday with complaints of acute onset heavy bleeding in the shower with passing clots.  On arrival to the ER she was noted to have severe range BP.  Patient reported intermittent headaches.  Complained of severe lower abdominal pain and cramping. No vision changes.

## 2023-06-27 NOTE — DISCHARGE INSTRUCTIONS
Postpartum Discharge Instructions:    No heavy lifting, straining, frequent rest periods  Pelvic rest--no douching, tampons, or intercourse until released by MD  Talk to your doctor about birth control--remember breastfeeding is not a method of birth control  Notify MD if bleeding becomes heavier than usual and if large clots, painful cramping,or foul odor develops.   Vaginal discharge will lighten and decrease in amount gradually.    If not breastfeeding, wear tight fitting sports bra for 1 week--remove only to bathe  Remember to keep your breast clean and dry to prevent any cracking  Notify MD if breast become reddened,swollen, nipples bleed or crack, or fever greater than 100.4  Look at incision everyday for redness, swelling, or drainage which may indicate infection  Notify MD of pain not relieved by pain medication.  Call MD or go to ER for any concens

## 2023-06-27 NOTE — ASSESSMENT & PLAN NOTE
Patient s/p 4g Magnesium sulfate bolus in ER at [facility]  Maintenance Magnesium sulfate not initiated there and decision made not to start here secondary to postpartum hemorrhage and hypotension.   Pre-e labs normal.   Asymptomatic.  Monitor BP

## 2023-06-27 NOTE — NURSING
Md called.  Asked MD if stat CBC could be done due to patient bleeding and also patient looking pale and c/o coldness. Last CBC was done around 1644.  MD ordered for stat CBC to be done.    Also reported BP has become stable once fluid bolus was initiated.  All other vitals stable as well.  Fluid has been decreased to slower rate due to past increased BPs in ED at [facility].     Pt given warm blankets.

## 2023-06-27 NOTE — NURSING
MD called.  Report given that bleeding has slowed.  No active bleeding noted.  Pt did have one gush of bleeding but was light.  Fundal massage attempted again but patient guarding.  No clots expressed. No active bleeding noted after gush.  Vitals are still stable. MD stated to watch urine output and no new orders given.

## 2023-06-27 NOTE — ASSESSMENT & PLAN NOTE
S/p cytotec and Pitocin with improvement in bleeding.    Initial drop in H/H on admission; follow up 6 hours later was reassuring.   IV iron this am.  Repeat CBC at 11 am.    If bleeding remains controlled, will consider discharge home this afternoon.

## 2023-06-29 ENCOUNTER — POSTPARTUM VISIT (OUTPATIENT)
Dept: OBSTETRICS AND GYNECOLOGY | Facility: CLINIC | Age: 28
End: 2023-06-29
Payer: MEDICAID

## 2023-06-29 VITALS
DIASTOLIC BLOOD PRESSURE: 70 MMHG | WEIGHT: 243 LBS | BODY MASS INDEX: 44.45 KG/M2 | SYSTOLIC BLOOD PRESSURE: 126 MMHG | HEART RATE: 90 BPM

## 2023-06-29 DIAGNOSIS — O24.415 GESTATIONAL DIABETES MELLITUS (GDM) IN THIRD TRIMESTER CONTROLLED ON ORAL HYPOGLYCEMIC DRUG: ICD-10-CM

## 2023-06-29 DIAGNOSIS — Z98.891 S/P CESAREAN SECTION: Primary | ICD-10-CM

## 2023-06-29 PROCEDURE — 59430 PR CARE AFTER DELIVERY ONLY: ICD-10-PCS | Mod: ,,, | Performed by: OBSTETRICS & GYNECOLOGY

## 2023-06-29 PROCEDURE — 99213 OFFICE O/P EST LOW 20 MIN: CPT | Mod: PBBFAC,TH | Performed by: OBSTETRICS & GYNECOLOGY

## 2023-06-29 PROCEDURE — 99999 PR PBB SHADOW E&M-EST. PATIENT-LVL III: ICD-10-PCS | Mod: PBBFAC,,, | Performed by: OBSTETRICS & GYNECOLOGY

## 2023-06-29 PROCEDURE — 99999 PR PBB SHADOW E&M-EST. PATIENT-LVL III: CPT | Mod: PBBFAC,,, | Performed by: OBSTETRICS & GYNECOLOGY

## 2023-06-29 NOTE — PROGRESS NOTES
Subjective:    Patient ID: Shawna Oseguera is a 28 y.o. y.o. female    Chief Complaint:   Chief Complaint   Patient presents with    Postpartum Care       History of Present Illness:  Shawna presents today for follow up of  delivery.  Patient recently presented to emergency department with complaints of bleeding and elevated blood pressure.  Patient was found to have postpartum preeclampsia and transferred to [facility].  She was also found to have a delayed postpartum hemorrhage.  She was stabilized and discharged the next morning.    Today she states she is feeling much better.  No further bleeding noted.  She notes a couple of bruises in her incision site.      Review of Systems   Respiratory:  Negative for shortness of breath.    Cardiovascular:  Negative for chest pain.   Gastrointestinal:  Positive for abdominal pain (Incisional).   Genitourinary:  Negative for pelvic pain and vaginal bleeding.   Musculoskeletal:  Negative for back pain.   Neurological:  Negative for syncope and headaches.       Objective:    Vital Signs:  Vitals:    23 1526   BP: 126/70   Pulse: 90     Wt Readings from Last 1 Encounters:   23 110.2 kg (243 lb)     Body mass index is 44.45 kg/m².    Physical Exam:  General:  alert, no distress   Skin:  Skin color, texture, turgor normal. No rashes or lesions   Abdomen:  Soft, nontender.  Incision is clean, dry, intact.  Ecchymoses consistent with recent Lovenox injection.   Extremities: No cyanosis, clubbing, edema         Questions regarding tubal ligation answered.  Activities and limitations reiterated and all questions answered.  Reassurance and encouragement given.          Assessment:      1. S/P  section    2. Delayed postpartum hemorrhage    3. Preeclampsia in postpartum period    4. Gestational diabetes mellitus (GDM) in third trimester controlled on oral hypoglycemic drug          Plan:      S/P  section    Delayed postpartum  hemorrhage    Preeclampsia in postpartum period    Gestational diabetes mellitus (GDM) in third trimester controlled on oral hypoglycemic drug      Return to clinic in 4 weeks for postpartum exam and 75 g oral glucose tolerance test     Giuliana Matthews MD, FACOG   06/29/2023 3:38 PM

## 2023-07-04 ENCOUNTER — ANESTHESIA (OUTPATIENT)
Dept: SURGERY | Facility: HOSPITAL | Age: 28
End: 2023-07-04
Payer: MEDICAID

## 2023-07-04 ENCOUNTER — ANESTHESIA EVENT (OUTPATIENT)
Dept: SURGERY | Facility: HOSPITAL | Age: 28
End: 2023-07-04
Payer: MEDICAID

## 2023-07-04 ENCOUNTER — HOSPITAL ENCOUNTER (OUTPATIENT)
Facility: HOSPITAL | Age: 28
Discharge: HOME | End: 2023-07-04
Attending: SURGERY | Admitting: STUDENT IN AN ORGANIZED HEALTH CARE EDUCATION/TRAINING PROGRAM
Payer: MEDICAID

## 2023-07-04 VITALS
BODY MASS INDEX: 43.95 KG/M2 | OXYGEN SATURATION: 99 % | HEART RATE: 59 BPM | WEIGHT: 240.31 LBS | RESPIRATION RATE: 18 BRPM | SYSTOLIC BLOOD PRESSURE: 102 MMHG | TEMPERATURE: 98 F | DIASTOLIC BLOOD PRESSURE: 53 MMHG

## 2023-07-04 DIAGNOSIS — N93.8 DYSFUNCTIONAL UTERINE BLEEDING: Primary | ICD-10-CM

## 2023-07-04 PROBLEM — N93.9 EXCESSIVE VAGINAL BLEEDING: Status: ACTIVE | Noted: 2023-07-04

## 2023-07-04 LAB
ABO + RH BLD: NORMAL
ALBUMIN SERPL BCP-MCNC: 3.7 G/DL (ref 3.5–5.2)
ALP SERPL-CCNC: 98 U/L (ref 55–135)
ALT SERPL W/O P-5'-P-CCNC: 24 U/L (ref 10–44)
ANION GAP SERPL CALC-SCNC: 14 MMOL/L (ref 8–16)
AST SERPL-CCNC: 27 U/L (ref 10–40)
BACTERIA #/AREA URNS HPF: ABNORMAL /HPF
BASOPHILS # BLD AUTO: 0.04 K/UL (ref 0–0.2)
BASOPHILS # BLD AUTO: 0.05 K/UL (ref 0–0.2)
BASOPHILS NFR BLD: 0.9 % (ref 0–1.9)
BASOPHILS NFR BLD: 0.9 % (ref 0–1.9)
BILIRUB SERPL-MCNC: 0.3 MG/DL (ref 0.1–1)
BILIRUB UR QL STRIP: ABNORMAL
BLD GP AB SCN CELLS X3 SERPL QL: NORMAL
BUN SERPL-MCNC: 18 MG/DL (ref 6–20)
CALCIUM SERPL-MCNC: 9 MG/DL (ref 8.7–10.5)
CHLORIDE SERPL-SCNC: 109 MMOL/L (ref 95–110)
CLARITY UR: ABNORMAL
CO2 SERPL-SCNC: 17 MMOL/L (ref 23–29)
COLOR UR: ABNORMAL
CREAT SERPL-MCNC: 0.7 MG/DL (ref 0.5–1.4)
DIFFERENTIAL METHOD: ABNORMAL
DIFFERENTIAL METHOD: ABNORMAL
EOSINOPHIL # BLD AUTO: 0.1 K/UL (ref 0–0.5)
EOSINOPHIL # BLD AUTO: 0.1 K/UL (ref 0–0.5)
EOSINOPHIL NFR BLD: 1.1 % (ref 0–8)
EOSINOPHIL NFR BLD: 2.3 % (ref 0–8)
ERYTHROCYTE [DISTWIDTH] IN BLOOD BY AUTOMATED COUNT: 13.7 % (ref 11.5–14.5)
ERYTHROCYTE [DISTWIDTH] IN BLOOD BY AUTOMATED COUNT: 13.9 % (ref 11.5–14.5)
EST. GFR  (NO RACE VARIABLE): >60 ML/MIN/1.73 M^2
GLUCOSE SERPL-MCNC: 87 MG/DL (ref 70–110)
GLUCOSE UR QL STRIP: ABNORMAL
HCT VFR BLD AUTO: 27 % (ref 37–48.5)
HCT VFR BLD AUTO: 28.9 % (ref 37–48.5)
HGB BLD-MCNC: 8.6 G/DL (ref 12–16)
HGB BLD-MCNC: 9.3 G/DL (ref 12–16)
HGB UR QL STRIP: ABNORMAL
IMM GRANULOCYTES # BLD AUTO: 0.02 K/UL (ref 0–0.04)
IMM GRANULOCYTES # BLD AUTO: 0.02 K/UL (ref 0–0.04)
IMM GRANULOCYTES NFR BLD AUTO: 0.4 % (ref 0–0.5)
IMM GRANULOCYTES NFR BLD AUTO: 0.4 % (ref 0–0.5)
KETONES UR QL STRIP: ABNORMAL
LEUKOCYTE ESTERASE UR QL STRIP: ABNORMAL
LYMPHOCYTES # BLD AUTO: 0.9 K/UL (ref 1–4.8)
LYMPHOCYTES # BLD AUTO: 2.2 K/UL (ref 1–4.8)
LYMPHOCYTES NFR BLD: 19.1 % (ref 18–48)
LYMPHOCYTES NFR BLD: 38.4 % (ref 18–48)
MCH RBC QN AUTO: 30.1 PG (ref 27–31)
MCH RBC QN AUTO: 30.1 PG (ref 27–31)
MCHC RBC AUTO-ENTMCNC: 31.9 G/DL (ref 32–36)
MCHC RBC AUTO-ENTMCNC: 32.2 G/DL (ref 32–36)
MCV RBC AUTO: 94 FL (ref 82–98)
MCV RBC AUTO: 94 FL (ref 82–98)
MICROSCOPIC COMMENT: ABNORMAL
MONOCYTES # BLD AUTO: 0.1 K/UL (ref 0.3–1)
MONOCYTES # BLD AUTO: 0.4 K/UL (ref 0.3–1)
MONOCYTES NFR BLD: 2.6 % (ref 4–15)
MONOCYTES NFR BLD: 7.9 % (ref 4–15)
NEUTROPHILS # BLD AUTO: 2.8 K/UL (ref 1.8–7.7)
NEUTROPHILS # BLD AUTO: 3.5 K/UL (ref 1.8–7.7)
NEUTROPHILS NFR BLD: 50.1 % (ref 38–73)
NEUTROPHILS NFR BLD: 75.9 % (ref 38–73)
NITRITE UR QL STRIP: ABNORMAL
NON-SQ EPI CELLS #/AREA URNS HPF: 2 /HPF
NRBC BLD-RTO: 0 /100 WBC
NRBC BLD-RTO: 0 /100 WBC
PH UR STRIP: ABNORMAL [PH] (ref 5–8)
PLATELET # BLD AUTO: 382 K/UL (ref 150–450)
PLATELET # BLD AUTO: 395 K/UL (ref 150–450)
PLATELET BLD QL SMEAR: ABNORMAL
PMV BLD AUTO: 10.3 FL (ref 9.2–12.9)
PMV BLD AUTO: 10.5 FL (ref 9.2–12.9)
POTASSIUM SERPL-SCNC: 4.7 MMOL/L (ref 3.5–5.1)
PROT SERPL-MCNC: 6.9 G/DL (ref 6–8.4)
PROT UR QL STRIP: ABNORMAL
RBC # BLD AUTO: 2.86 M/UL (ref 4–5.4)
RBC # BLD AUTO: 3.09 M/UL (ref 4–5.4)
RBC #/AREA URNS HPF: >100 /HPF (ref 0–4)
SODIUM SERPL-SCNC: 140 MMOL/L (ref 136–145)
SP GR UR STRIP: ABNORMAL (ref 1–1.03)
SPECIMEN OUTDATE: NORMAL
SQUAMOUS #/AREA URNS HPF: 10 /HPF
URN SPEC COLLECT METH UR: ABNORMAL
UROBILINOGEN UR STRIP-ACNC: ABNORMAL EU/DL
WBC # BLD AUTO: 4.56 K/UL (ref 3.9–12.7)
WBC # BLD AUTO: 5.6 K/UL (ref 3.9–12.7)
WBC #/AREA URNS HPF: 20 /HPF (ref 0–5)

## 2023-07-04 PROCEDURE — 00940 ANES VAGINAL PX NOS: CPT | Performed by: STUDENT IN AN ORGANIZED HEALTH CARE EDUCATION/TRAINING PROGRAM

## 2023-07-04 PROCEDURE — 86900 BLOOD TYPING SEROLOGIC ABO: CPT | Performed by: SURGERY

## 2023-07-04 PROCEDURE — 25000003 PHARM REV CODE 250: Performed by: SURGERY

## 2023-07-04 PROCEDURE — 87086 URINE CULTURE/COLONY COUNT: CPT | Performed by: SURGERY

## 2023-07-04 PROCEDURE — 88305 TISSUE EXAM BY PATHOLOGIST: ICD-10-PCS | Mod: 26,,, | Performed by: PATHOLOGY

## 2023-07-04 PROCEDURE — 88305 TISSUE EXAM BY PATHOLOGIST: CPT | Mod: 26,,, | Performed by: PATHOLOGY

## 2023-07-04 PROCEDURE — 99291 CRITICAL CARE FIRST HOUR: CPT

## 2023-07-04 PROCEDURE — 36000705 HC OR TIME LEV I EA ADD 15 MIN: Performed by: STUDENT IN AN ORGANIZED HEALTH CARE EDUCATION/TRAINING PROGRAM

## 2023-07-04 PROCEDURE — 85025 COMPLETE CBC W/AUTO DIFF WBC: CPT | Performed by: SURGERY

## 2023-07-04 PROCEDURE — 71000033 HC RECOVERY, INTIAL HOUR: Performed by: STUDENT IN AN ORGANIZED HEALTH CARE EDUCATION/TRAINING PROGRAM

## 2023-07-04 PROCEDURE — D9220AH HC ANESTHESIA PROFESSIONAL FEE: Mod: QZ | Performed by: NURSE ANESTHETIST, CERTIFIED REGISTERED

## 2023-07-04 PROCEDURE — 99284 EMERGENCY DEPT VISIT MOD MDM: CPT

## 2023-07-04 PROCEDURE — 59160 D & C AFTER DELIVERY: CPT | Mod: 78,,, | Performed by: STUDENT IN AN ORGANIZED HEALTH CARE EDUCATION/TRAINING PROGRAM

## 2023-07-04 PROCEDURE — 25000003 PHARM REV CODE 250: Performed by: NURSE ANESTHETIST, CERTIFIED REGISTERED

## 2023-07-04 PROCEDURE — 36000704 HC OR TIME LEV I 1ST 15 MIN: Performed by: STUDENT IN AN ORGANIZED HEALTH CARE EDUCATION/TRAINING PROGRAM

## 2023-07-04 PROCEDURE — 81000 URINALYSIS NONAUTO W/SCOPE: CPT | Performed by: SURGERY

## 2023-07-04 PROCEDURE — 59160 PR D&C AFTER DELIVERY: ICD-10-PCS | Mod: 78,,, | Performed by: STUDENT IN AN ORGANIZED HEALTH CARE EDUCATION/TRAINING PROGRAM

## 2023-07-04 PROCEDURE — 25000003 PHARM REV CODE 250: Performed by: STUDENT IN AN ORGANIZED HEALTH CARE EDUCATION/TRAINING PROGRAM

## 2023-07-04 PROCEDURE — 00940 ANES VAGINAL PX NOS: CPT | Mod: QZ | Performed by: NURSE ANESTHETIST, CERTIFIED REGISTERED

## 2023-07-04 PROCEDURE — 88305 TISSUE EXAM BY PATHOLOGIST: CPT | Performed by: PATHOLOGY

## 2023-07-04 PROCEDURE — 37000008 HC ANESTHESIA 1ST 15 MINUTES: Performed by: STUDENT IN AN ORGANIZED HEALTH CARE EDUCATION/TRAINING PROGRAM

## 2023-07-04 PROCEDURE — 63600175 PHARM REV CODE 636 W HCPCS: Performed by: NURSE ANESTHETIST, CERTIFIED REGISTERED

## 2023-07-04 PROCEDURE — 80053 COMPREHEN METABOLIC PANEL: CPT | Performed by: SURGERY

## 2023-07-04 PROCEDURE — 36415 COLL VENOUS BLD VENIPUNCTURE: CPT | Performed by: SURGERY

## 2023-07-04 PROCEDURE — 37000009 HC ANESTHESIA EA ADD 15 MINS: Performed by: STUDENT IN AN ORGANIZED HEALTH CARE EDUCATION/TRAINING PROGRAM

## 2023-07-04 PROCEDURE — 85025 COMPLETE CBC W/AUTO DIFF WBC: CPT | Mod: 91 | Performed by: STUDENT IN AN ORGANIZED HEALTH CARE EDUCATION/TRAINING PROGRAM

## 2023-07-04 PROCEDURE — 36415 COLL VENOUS BLD VENIPUNCTURE: CPT | Performed by: STUDENT IN AN ORGANIZED HEALTH CARE EDUCATION/TRAINING PROGRAM

## 2023-07-04 RX ORDER — PROCHLORPERAZINE EDISYLATE 5 MG/ML
5 INJECTION INTRAMUSCULAR; INTRAVENOUS EVERY 6 HOURS PRN
Status: DISCONTINUED | OUTPATIENT
Start: 2023-07-04 | End: 2023-07-04 | Stop reason: HOSPADM

## 2023-07-04 RX ORDER — HYDROMORPHONE HYDROCHLORIDE 1 MG/ML
1 INJECTION, SOLUTION INTRAMUSCULAR; INTRAVENOUS; SUBCUTANEOUS EVERY 6 HOURS PRN
Status: DISCONTINUED | OUTPATIENT
Start: 2023-07-04 | End: 2023-07-04 | Stop reason: HOSPADM

## 2023-07-04 RX ORDER — DIPHENHYDRAMINE HYDROCHLORIDE 50 MG/ML
25 INJECTION INTRAMUSCULAR; INTRAVENOUS EVERY 4 HOURS PRN
Status: DISCONTINUED | OUTPATIENT
Start: 2023-07-04 | End: 2023-07-04 | Stop reason: HOSPADM

## 2023-07-04 RX ORDER — MIDAZOLAM HYDROCHLORIDE 1 MG/ML
INJECTION INTRAMUSCULAR; INTRAVENOUS
Status: DISCONTINUED | OUTPATIENT
Start: 2023-07-04 | End: 2023-07-04

## 2023-07-04 RX ORDER — ONDANSETRON 8 MG/1
8 TABLET, ORALLY DISINTEGRATING ORAL EVERY 8 HOURS PRN
Status: DISCONTINUED | OUTPATIENT
Start: 2023-07-04 | End: 2023-07-04 | Stop reason: HOSPADM

## 2023-07-04 RX ORDER — ONDANSETRON HYDROCHLORIDE 2 MG/ML
INJECTION, SOLUTION INTRAMUSCULAR; INTRAVENOUS
Status: DISCONTINUED | OUTPATIENT
Start: 2023-07-04 | End: 2023-07-04

## 2023-07-04 RX ORDER — PROPOFOL 10 MG/ML
VIAL (ML) INTRAVENOUS
Status: DISCONTINUED | OUTPATIENT
Start: 2023-07-04 | End: 2023-07-04

## 2023-07-04 RX ORDER — DIPHENHYDRAMINE HCL 25 MG
25 CAPSULE ORAL EVERY 4 HOURS PRN
Status: DISCONTINUED | OUTPATIENT
Start: 2023-07-04 | End: 2023-07-04 | Stop reason: HOSPADM

## 2023-07-04 RX ORDER — HYDROMORPHONE HYDROCHLORIDE 2 MG/ML
INJECTION, SOLUTION INTRAMUSCULAR; INTRAVENOUS; SUBCUTANEOUS
Status: DISCONTINUED | OUTPATIENT
Start: 2023-07-04 | End: 2023-07-04

## 2023-07-04 RX ORDER — FENTANYL CITRATE 50 UG/ML
INJECTION, SOLUTION INTRAMUSCULAR; INTRAVENOUS
Status: DISCONTINUED | OUTPATIENT
Start: 2023-07-04 | End: 2023-07-04

## 2023-07-04 RX ORDER — HYDROCODONE BITARTRATE AND ACETAMINOPHEN 5; 325 MG/1; MG/1
1 TABLET ORAL EVERY 6 HOURS PRN
Qty: 8 TABLET | Refills: 0 | Status: SHIPPED | OUTPATIENT
Start: 2023-07-04 | End: 2023-07-06

## 2023-07-04 RX ORDER — LIDOCAINE HYDROCHLORIDE 20 MG/ML
INJECTION, SOLUTION EPIDURAL; INFILTRATION; INTRACAUDAL; PERINEURAL
Status: DISCONTINUED | OUTPATIENT
Start: 2023-07-04 | End: 2023-07-04

## 2023-07-04 RX ORDER — IBUPROFEN 600 MG/1
600 TABLET ORAL EVERY 6 HOURS PRN
Status: DISCONTINUED | OUTPATIENT
Start: 2023-07-04 | End: 2023-07-04 | Stop reason: HOSPADM

## 2023-07-04 RX ORDER — IBUPROFEN 800 MG/1
800 TABLET ORAL 3 TIMES DAILY
Qty: 15 TABLET | Refills: 0 | Status: SHIPPED | OUTPATIENT
Start: 2023-07-04 | End: 2023-07-09

## 2023-07-04 RX ORDER — HYDROCODONE BITARTRATE AND ACETAMINOPHEN 5; 325 MG/1; MG/1
1 TABLET ORAL EVERY 4 HOURS PRN
Status: DISCONTINUED | OUTPATIENT
Start: 2023-07-04 | End: 2023-07-04 | Stop reason: HOSPADM

## 2023-07-04 RX ORDER — HYDROCODONE BITARTRATE AND ACETAMINOPHEN 7.5; 325 MG/1; MG/1
1 TABLET ORAL EVERY 4 HOURS PRN
Status: DISCONTINUED | OUTPATIENT
Start: 2023-07-04 | End: 2023-07-04 | Stop reason: HOSPADM

## 2023-07-04 RX ORDER — DEXAMETHASONE SODIUM PHOSPHATE 4 MG/ML
INJECTION, SOLUTION INTRA-ARTICULAR; INTRALESIONAL; INTRAMUSCULAR; INTRAVENOUS; SOFT TISSUE
Status: DISCONTINUED | OUTPATIENT
Start: 2023-07-04 | End: 2023-07-04

## 2023-07-04 RX ORDER — OXYTOCIN 10 [USP'U]/ML
INJECTION, SOLUTION INTRAMUSCULAR; INTRAVENOUS
Status: DISCONTINUED | OUTPATIENT
Start: 2023-07-04 | End: 2023-07-04

## 2023-07-04 RX ORDER — SUCCINYLCHOLINE CHLORIDE 20 MG/ML
INJECTION INTRAMUSCULAR; INTRAVENOUS
Status: DISCONTINUED | OUTPATIENT
Start: 2023-07-04 | End: 2023-07-04

## 2023-07-04 RX ADMIN — HYDROCODONE BITARTRATE AND ACETAMINOPHEN 1 TABLET: 7.5; 325 TABLET ORAL at 09:07

## 2023-07-04 RX ADMIN — SODIUM CHLORIDE 1000 ML: 9 INJECTION, SOLUTION INTRAVENOUS at 07:07

## 2023-07-04 RX ADMIN — DOXYCYCLINE 200 MG: 100 INJECTION, POWDER, LYOPHILIZED, FOR SOLUTION INTRAVENOUS at 07:07

## 2023-07-04 RX ADMIN — SODIUM CHLORIDE: 0.9 INJECTION, SOLUTION INTRAVENOUS at 07:07

## 2023-07-04 RX ADMIN — LIDOCAINE HYDROCHLORIDE 50 MG: 20 INJECTION, SOLUTION EPIDURAL; INFILTRATION; INTRACAUDAL; PERINEURAL at 07:07

## 2023-07-04 RX ADMIN — HYDROMORPHONE HYDROCHLORIDE 2 MG: 2 INJECTION, SOLUTION INTRAMUSCULAR; INTRAVENOUS; SUBCUTANEOUS at 08:07

## 2023-07-04 RX ADMIN — SUCCINYLCHOLINE CHLORIDE 120 MG: 20 INJECTION, SOLUTION INTRAMUSCULAR; INTRAVENOUS at 07:07

## 2023-07-04 RX ADMIN — ONDANSETRON 4 MG: 2 INJECTION INTRAMUSCULAR; INTRAVENOUS at 07:07

## 2023-07-04 RX ADMIN — MIDAZOLAM HYDROCHLORIDE 2 MG: 1 INJECTION, SOLUTION INTRAMUSCULAR; INTRAVENOUS at 07:07

## 2023-07-04 RX ADMIN — DEXAMETHASONE SODIUM PHOSPHATE 4 MG: 4 INJECTION, SOLUTION INTRAMUSCULAR; INTRAVENOUS at 07:07

## 2023-07-04 RX ADMIN — OXYTOCIN 10 UNITS: 10 INJECTION INTRAVENOUS at 07:07

## 2023-07-04 RX ADMIN — FENTANYL CITRATE 100 MCG: 0.05 INJECTION, SOLUTION INTRAMUSCULAR; INTRAVENOUS at 07:07

## 2023-07-04 RX ADMIN — PROPOFOL 160 MG: 10 INJECTION, EMULSION INTRAVENOUS at 07:07

## 2023-07-04 NOTE — HPI
Patient is a 27 yo  presenting with continued heavy vaginal bleeding to the emergency room overnight. She is status post  section on 2023. This the second time that she is re-presenting with heavy bleeding. Discussed a dilation and curettage with the patient via  and she consents to have the procedure.    Poor Unable to assess Poor

## 2023-07-04 NOTE — OP NOTE
Operative Note    Date: 2023    Procedure: Suction D&C    Surgeon: Breonna Ferrari MD    Assistant: n/a    Pre-op Dx:   Heavy vaginal bleeding    Post-op Dx: same    Anesthesia: general    EBL: 200 cc    IVFs: 1200 cc    UOP: 100 cc    DVT prophylaxis: SCDs    Perioperative antibiotics: Doxycycline    Specimen: retained placenta    Operative Findings: Large blood clots coming out of cervical os on speculum exam. Uterus sounded to 18 cm. Under ultrasound guidance, large amounts of clot seen within the uterus. Entire procedure performed with ultrasound    Brief HPI: Patient is a 27 yo  presenting to the emergency room with heavy vaginal bleeding. She is status post  section on 23 and this is the second episode of heavy bleeding since delivery. Patient consented via  and agreed to dilation and curettage    OPERATIVE NOTE:  The patient was taken to the operating room were MAC anesthesia was found to be adequate. She was prepped and draped in the normal sterile fashion in the dorsal lithotomy position. An in and out catheter was used to drain the bladder. A weighted speculum was inserted into the posterior vagina and the cervix was visualized. The cervix was grasped with an allis clamp. The cervix was then dilated up to 9 mm with the Hegar dilatators. The suction catheter was inserted into the uterine cavity and activated. Multiple passes were made until a gritty texture of the endometrium was appreciated. A sharp curettage was also preformed.  All instruments were then removed. The patient tolerated the procedure well. Instrument, needle, and lap counts were correct x2. The patient was taken to recovery room in stable condition.

## 2023-07-04 NOTE — ASSESSMENT & PLAN NOTE
- excessive vaginal bleeding post  section x 2 episodes  - decision made to proceed with D&C; all r/b/a/i of the procedure were discussed with the patient and consents were signed with an . All questions were answered  - type and screen, CBC  - Doxcycline 200mg IV once  - H/H: 9.3/28.9

## 2023-07-04 NOTE — ANESTHESIA POSTPROCEDURE EVALUATION
Anesthesia Post Evaluation    Patient: Shawna Oseguera    Procedure(s) Performed: Procedure(s) (LRB):  DILATION AND CURETTAGE, UTERUS (N/A)    Final Anesthesia Type: general      Patient location during evaluation: PACU  Patient participation: Yes- Able to Participate  Level of consciousness: awake and alert, oriented and awake  Post-procedure vital signs: reviewed and stable  Pain management: adequate  Airway patency: patent    PONV status at discharge: No PONV  Anesthetic complications: no      Cardiovascular status: blood pressure returned to baseline, hemodynamically stable and stable  Respiratory status: unassisted, spontaneous ventilation and room air  Hydration status: euvolemic  Follow-up not needed.          Vitals Value Taken Time   /73 07/04/23 0811   Temp 36.2 °C (97.2 °F) 07/04/23 0802   Pulse 65 07/04/23 0811   Resp 18 07/04/23 0811   SpO2 96 % 07/04/23 0811         No case tracking events are documented in the log.      Pain/Rickie Score: Rickie Score: 10 (7/4/2023  8:11 AM)

## 2023-07-04 NOTE — ANESTHESIA PROCEDURE NOTES
Intubation    Date/Time: 7/4/2023 7:17 AM  Performed by: Dashawn Tellez CRNA  Authorized by: Dashawn Tellez CRNA     Intubation:     Intubated:  Postinduction    Mask Ventilation:  Easy mask    Attempts:  1    Attempted By:  CRNA    Method of Intubation:  Direct    Blade:  Galdamez 3    Laryngeal View Grade: Grade I - full view of cords      Difficult Airway Encountered?: No      Complications:  None    Airway Device:  Oral endotracheal tube    Airway Device Size:  7.5    Style/Cuff Inflation:  Cuffed    Tube secured:  20    Secured at:  The lips    Placement Verified By:  Capnometry    Complicating Factors:  None    Findings Post-Intubation:  BS equal bilateral

## 2023-07-04 NOTE — H&P
[facility]  Obstetrics & Gynecology  History & Physical    Patient Name: Shawna Oseguera  MRN: [id number]  Admission Date: 2023  Primary Care Provider: Giuliana Matthews MD    Subjective:     Chief Complaint/Reason for Admission: vaginal bleeding post  section     History of Present Illness:  Patient is a 29 yo  presenting with continued heavy vaginal bleeding to the emergency room overnight. She is status post  section on 2023. This the second time that she is re-presenting with heavy bleeding. Discussed a dilation and curettage with the patient via  and she consents to have the procedure.           OB History    Para Term  AB Living   3 3 3 0 0 3   SAB IAB Ectopic Multiple Live Births   0 0 0 0 3      # Outcome Date GA Lbr Colt/2nd Weight Sex Delivery Anes PTL Lv   3 Term 23 37w2d  3.657 kg (8 lb 1 oz) M CS-LTranv Spinal N CAT      Name: FADIA SANCHES      Apgar1: 8  Apgar5: 9   2 Term  38w0d  3.204 kg (7 lb 1 oz) M CS-LTranv   CAT      Birth Comments: GHTN   1 Term  37w0d  2.693 kg (5 lb 15 oz) M CS-Unspec   CAT      Birth Comments: oligo, GHTN     Past Medical History:   Diagnosis Date    Diabetes mellitus     Unspecified pre-eclampsia, unspecified trimester      Past Surgical History:   Procedure Laterality Date     SECTION       SECTION WITH TUBAL LIGATION N/A 2023    Procedure: REPEAT  SECTION, WITH BILATERAL TUBAL LIGATION;  Surgeon: Giuliana Matthews MD;  Location: [facility];  Service: OB/GYN;  Laterality: N/A;       (Not in a hospital admission)      Review of patient's allergies indicates:  No Known Allergies     Family History       Problem Relation (Age of Onset)    Diabetes Mother, Sister    Hypertension Mother, Sister          Tobacco Use    Smoking status: Never    Smokeless tobacco: Never   Substance and Sexual Activity    Alcohol use: Not Currently    Drug use: Never     Sexual activity: Yes     Partners: Male     Review of Systems   Constitutional:  Negative for appetite change, chills, fever and unexpected weight change.   Eyes:  Negative for visual disturbance.   Respiratory:  Negative for shortness of breath.    Cardiovascular:  Negative for chest pain.   Gastrointestinal:  Negative for abdominal pain, constipation, diarrhea, nausea and vomiting.   Genitourinary:  Positive for vaginal bleeding. Negative for dyspareunia.   Musculoskeletal:  Negative for back pain.   Neurological:  Negative for syncope and headaches.   Psychiatric/Behavioral:  Negative for depression. The patient is not nervous/anxious.     Objective:     Vital Signs (Most Recent):  Temp: 97.9 °F (36.6 °C) (07/04/23 0227)  Pulse: (!) 59 (07/04/23 0542)  Resp: 18 (07/04/23 0227)  BP: 132/72 (07/04/23 0541)  SpO2: 100 % (07/04/23 0542) Vital Signs (24h Range):  Temp:  [97.9 °F (36.6 °C)] 97.9 °F (36.6 °C)  Pulse:  [59-67] 59  Resp:  [18] 18  SpO2:  [98 %-100 %] 100 %  BP: (132-154)/(72-77) 132/72     Weight: 109 kg (240 lb 4.8 oz)  Body mass index is 43.95 kg/m².    No LMP recorded.     Physical Exam:   Constitutional: She is oriented to person, place, and time. She appears well-developed and well-nourished. No distress.    HENT:   Head: Normocephalic and atraumatic.    Eyes: Pupils are equal, round, and reactive to light. EOM are normal.     Cardiovascular:  Normal rate.             Pulmonary/Chest: Effort normal. No respiratory distress.                  Musculoskeletal: Normal range of motion and moves all extremeties.       Neurological: She is alert and oriented to person, place, and time.     Psychiatric: She has a normal mood and affect. Her behavior is normal. Judgment and thought content normal.      Laboratory:  Recent Lab Results         07/04/23  0549 07/04/23 0251        Albumin   3.7       Alkaline Phosphatase   98       ALT   24       Anion Gap   14       Appearance, UA Cloudy         AST   27        Bacteria, UA Few         Baso #   0.05       Basophil %   0.9       Bilirubin (UA) SEE COMMENT  Comment: Color may interfere with results         BILIRUBIN TOTAL   0.3  Comment: For infants and newborns, interpretation of results should be based  on gestational age, weight and in agreement with clinical  observations.    Premature Infant recommended reference ranges:  Up to 24 hours.............<8.0 mg/dL  Up to 48 hours............<12.0 mg/dL  3-5 days..................<15.0 mg/dL  6-29 days.................<15.0 mg/dL         BUN   18       Calcium   9.0       Chloride   109       CO2   17       Color, UA Red         Creatinine   0.7       Differential Method   Automated       eGFR   >60       Eos #   0.1       Eosinophil %   2.3       Glucose   87       Glucose, UA SEE COMMENT  Comment: Color may interfere with results         Gran # (ANC)   2.8       Gran %   50.1       Group & Rh   O POS       Hematocrit   28.9       Hemoglobin   9.3       Immature Grans (Abs)   0.02  Comment: Mild elevation in immature granulocytes is non specific and   can be seen in a variety of conditions including stress response,   acute inflammation, trauma and pregnancy. Correlation with other   laboratory and clinical findings is essential.         Immature Granulocytes   0.4       INDIRECT EMMETT   NEG       Ketones, UA SEE COMMENT  Comment: Color may interfere with results         Leukocytes, UA SEE COMMENT  Comment: Color may interfere with results         Lymph #   2.2       Lymph %   38.4       MCH   30.1       MCHC   32.2       MCV   94       Microscopic Comment SEE COMMENT  Comment: Other formed elements not mentioned in the report are not   present in the microscopic examination.            Mono #   0.4       Mono %   7.9       MPV   10.5       NITRITE UA SEE COMMENT  Comment: Color may interfere with results         Non-Squam Epith 2         nRBC   0       Occult Blood UA SEE COMMENT  Comment: Color may interfere with  results         pH, UA SEE COMMENT  Comment: Color may interfere with results         Platelet Estimate   Appears normal       Platelets   395       Potassium   4.7       PROTEIN TOTAL   6.9       Protein, UA SEE COMMENT  Comment: Recommend a 24 hour urine protein or a urine   protein/creatinine ratio if globulin induced proteinuria is  clinically suspected.  Color may interfere with results           RBC   3.09       RBC, UA >100         RDW   13.9       Sodium   140       Specific [place], UA SEE COMMENT  Comment: Color may interfere with results         Specimen Outdate   2023 23:59       Specimen UA Urine, Clean Catch         Squam Epithel, UA 10         UROBILINOGEN UA SEE COMMENT  Comment: Color may interfere with results         WBC, UA 20         WBC   5.60             I have personallly reviewed all pertinent lab results from the last 24 hours.    Diagnostic Results:  Labs: Reviewed    Assessment/Plan:     Renal/  * Excessive vaginal bleeding  - excessive vaginal bleeding post  section x 2 episodes  - decision made to proceed with D&C; all r/b/a/i of the procedure were discussed with the patient and consents were signed with an . All questions were answered  - type and screen, CBC  - Doxcycline 200mg IV once  - H/H: 9.3/28.9          Breonna Ferrari MD  Obstetrics & Gynecology  [place] [facility]

## 2023-07-04 NOTE — SUBJECTIVE & OBJECTIVE
OB History    Para Term  AB Living   3 3 3 0 0 3   SAB IAB Ectopic Multiple Live Births   0 0 0 0 3      # Outcome Date GA Lbr Colt/2nd Weight Sex Delivery Anes PTL Lv   3 Term 23 37w2d  3.657 kg (8 lb 1 oz) M CS-LTranv Spinal N CAT      Name: FADIA SANCHES      Apgar1: 8  Apgar5: 9   2 Term  38w0d  3.204 kg (7 lb 1 oz) M CS-LTranv   CAT      Birth Comments: GHTN   1 Term  37w0d  2.693 kg (5 lb 15 oz) M CS-Unspec   CAT      Birth Comments: oligo, GHTN     Past Medical History:   Diagnosis Date    Diabetes mellitus     Unspecified pre-eclampsia, unspecified trimester      Past Surgical History:   Procedure Laterality Date     SECTION       SECTION WITH TUBAL LIGATION N/A 2023    Procedure: REPEAT  SECTION, WITH BILATERAL TUBAL LIGATION;  Surgeon: Giuliana Matthews MD;  Location: [facility];  Service: OB/GYN;  Laterality: N/A;       (Not in a hospital admission)      Review of patient's allergies indicates:  No Known Allergies     Family History       Problem Relation (Age of Onset)    Diabetes Mother, Sister    Hypertension Mother, Sister          Tobacco Use    Smoking status: Never    Smokeless tobacco: Never   Substance and Sexual Activity    Alcohol use: Not Currently    Drug use: Never    Sexual activity: Yes     Partners: Male     Review of Systems   Constitutional:  Negative for appetite change, chills, fever and unexpected weight change.   Eyes:  Negative for visual disturbance.   Respiratory:  Negative for shortness of breath.    Cardiovascular:  Negative for chest pain.   Gastrointestinal:  Negative for abdominal pain, constipation, diarrhea, nausea and vomiting.   Genitourinary:  Positive for vaginal bleeding. Negative for dyspareunia.   Musculoskeletal:  Negative for back pain.   Neurological:  Negative for syncope and headaches.   Psychiatric/Behavioral:  Negative for depression. The patient is not nervous/anxious.     Objective:      Vital Signs (Most Recent):  Temp: 97.9 °F (36.6 °C) (07/04/23 0227)  Pulse: (!) 59 (07/04/23 0542)  Resp: 18 (07/04/23 0227)  BP: 132/72 (07/04/23 0541)  SpO2: 100 % (07/04/23 0542) Vital Signs (24h Range):  Temp:  [97.9 °F (36.6 °C)] 97.9 °F (36.6 °C)  Pulse:  [59-67] 59  Resp:  [18] 18  SpO2:  [98 %-100 %] 100 %  BP: (132-154)/(72-77) 132/72     Weight: 109 kg (240 lb 4.8 oz)  Body mass index is 43.95 kg/m².    No LMP recorded.     Physical Exam:   Constitutional: She is oriented to person, place, and time. She appears well-developed and well-nourished. No distress.    HENT:   Head: Normocephalic and atraumatic.    Eyes: Pupils are equal, round, and reactive to light. EOM are normal.     Cardiovascular:  Normal rate.             Pulmonary/Chest: Effort normal. No respiratory distress.                  Musculoskeletal: Normal range of motion and moves all extremeties.       Neurological: She is alert and oriented to person, place, and time.     Psychiatric: She has a normal mood and affect. Her behavior is normal. Judgment and thought content normal.      Laboratory:  Recent Lab Results         07/04/23  0549   07/04/23  0251        Albumin   3.7       Alkaline Phosphatase   98       ALT   24       Anion Gap   14       Appearance, UA Cloudy         AST   27       Bacteria, UA Few         Baso #   0.05       Basophil %   0.9       Bilirubin (UA) SEE COMMENT  Comment: Color may interfere with results         BILIRUBIN TOTAL   0.3  Comment: For infants and newborns, interpretation of results should be based  on gestational age, weight and in agreement with clinical  observations.    Premature Infant recommended reference ranges:  Up to 24 hours.............<8.0 mg/dL  Up to 48 hours............<12.0 mg/dL  3-5 days..................<15.0 mg/dL  6-29 days.................<15.0 mg/dL         BUN   18       Calcium   9.0       Chloride   109       CO2   17       Color, UA Red         Creatinine   0.7        Differential Method   Automated       eGFR   >60       Eos #   0.1       Eosinophil %   2.3       Glucose   87       Glucose, UA SEE COMMENT  Comment: Color may interfere with results         Gran # (ANC)   2.8       Gran %   50.1       Group & Rh   O POS       Hematocrit   28.9       Hemoglobin   9.3       Immature Grans (Abs)   0.02  Comment: Mild elevation in immature granulocytes is non specific and   can be seen in a variety of conditions including stress response,   acute inflammation, trauma and pregnancy. Correlation with other   laboratory and clinical findings is essential.         Immature Granulocytes   0.4       INDIRECT EMMETT   NEG       Ketones, UA SEE COMMENT  Comment: Color may interfere with results         Leukocytes, UA SEE COMMENT  Comment: Color may interfere with results         Lymph #   2.2       Lymph %   38.4       MCH   30.1       MCHC   32.2       MCV   94       Microscopic Comment SEE COMMENT  Comment: Other formed elements not mentioned in the report are not   present in the microscopic examination.            Mono #   0.4       Mono %   7.9       MPV   10.5       NITRITE UA SEE COMMENT  Comment: Color may interfere with results         Non-Squam Epith 2         nRBC   0       Occult Blood UA SEE COMMENT  Comment: Color may interfere with results         pH, UA SEE COMMENT  Comment: Color may interfere with results         Platelet Estimate   Appears normal       Platelets   395       Potassium   4.7       PROTEIN TOTAL   6.9       Protein, UA SEE COMMENT  Comment: Recommend a 24 hour urine protein or a urine   protein/creatinine ratio if globulin induced proteinuria is  clinically suspected.  Color may interfere with results           RBC   3.09       RBC, UA >100         RDW   13.9       Sodium   140       Specific [place], UA SEE COMMENT  Comment: Color may interfere with results         Specimen Outdate   07/07/2023 23:59       Specimen UA Urine, Clean Catch         Squam Epithel,  UA 10         UROBILINOGEN UA SEE COMMENT  Comment: Color may interfere with results         WBC, UA 20         WBC   5.60             I have personallly reviewed all pertinent lab results from the last 24 hours.    Diagnostic Results:  Labs: Reviewed

## 2023-07-04 NOTE — DISCHARGE SUMMARY
[facility]  Obstetrics & Gynecology  Discharge Summary    Patient Name: Shawna Oseguera  MRN: [id number]  Admission Date: 2023  Hospital Length of Stay: 0 days  Discharge Date and Time:  2023 8:02 AM  Attending Physician: No att. providers found   Discharging Provider: Breonna Ferrari MD  Primary Care Provider: Giuliana Matthews MD    HPI:  Patient is a 29 yo  presenting with continued heavy vaginal bleeding to the emergency room overnight. She is status post  section on 2023. This the second time that she is re-presenting with heavy bleeding. Discussed a dilation and curettage with the patient via  and she consents to have the procedure.       Hospital Course:  Patient presented for scheduled surgery, see op note.  She was transferred to PACU then to the floor to recover.  No acute events.  She was discharged home when meeting all discharge criteria.        Goals of Care Treatment Preferences:  Code Status: Full Code      Procedure(s) (LRB):  DILATION AND CURETTAGE, UTERUS (N/A)         Significant Diagnostic Studies: Labs:   CBC   Recent Labs   Lab 23  0251   WBC 5.60   HGB 9.3*   HCT 28.9*       and All labs within the past 24 hours have been reviewed      Pending Diagnostic Studies:     Procedure Component Value Units Date/Time    Specimen to Pathology, Surgery Gynecology and Obstetrics [[id number]] Collected: 23    Order Status: Sent Lab Status: In process Updated: 23    Specimen: Tissue         Final Active Diagnoses:    Diagnosis Date Noted POA    PRINCIPAL PROBLEM:  Retained placenta with hemorrhage [O72.0] 2023 Yes    Excessive vaginal bleeding [N93.9] 2023 Yes      Problems Resolved During this Admission:        Discharged Condition: good    Disposition: Home or Self Care    Follow Up:    Patient Instructions:   No discharge procedures on file.  Medications:  Reconciled Home Medications:       Medication List      START taking these medications    HYDROcodone-acetaminophen 5-325 mg per tablet  Commonly known as: NORCO  Take 1 tablet by mouth every 6 (six) hours as needed for Pain.        CHANGE how you take these medications    ibuprofen 800 MG tablet  Commonly known as: ADVIL,MOTRIN  Take 1 tablet (800 mg total) by mouth 3 (three) times daily. for 5 days  What changed:   · when to take this  · reasons to take this        STOP taking these medications    oxyCODONE 5 MG immediate release tablet  Commonly known as: ROXICODONE     PRENATAL 1+1 ORAL            Breonna Ferrari MD  Obstetrics & Gynecology  [place] [facility]

## 2023-07-04 NOTE — ED PROVIDER NOTES
"Encounter Date: 2023       History     Chief Complaint   Patient presents with    Vaginal Bleeding     Pt recently delivered . Was seen since delivery for " hemorrhage"; having pain and increased clots today. Pt describes pain as contractions that happen every 2-4 minutes.      Shawna Oseguera is a 28 y.o. female that presents with continued bleeding  Had  section on , presented back to [facility] on   The patient at that time had postpartum hemorrhage as well as hypertension issues  Patient was brought back to [facility] on  & stayed overnight per HX  Bleeding was stable at that time, blood pressure was better per the discharge note  Patient subsequently saw OBGYN Dr. Giuliana Matthews in [facility] on the   No bleeding, good blood pressure, was doing much better per the chart review   Patient today represents with heavy vaginal bleeding, copious amounts of blood  She rates it is constant, intense with a 10/10 severity on evaluation in the ER    Review of patient's allergies indicates:  No Known Allergies  Past Medical History:   Diagnosis Date    Diabetes mellitus     Unspecified pre-eclampsia, unspecified trimester      Past Surgical History:   Procedure Laterality Date     SECTION       SECTION WITH TUBAL LIGATION N/A 2023    Procedure: REPEAT  SECTION, WITH BILATERAL TUBAL LIGATION;  Surgeon: Giuliana Matthews MD;  Location: [facility] OR;  Service: OB/GYN;  Laterality: N/A;     Family History   Problem Relation Age of Onset    Hypertension Mother     Diabetes Mother     Hypertension Sister     Diabetes Sister      Social History     Tobacco Use    Smoking status: Never    Smokeless tobacco: Never   Substance Use Topics    Alcohol use: Not Currently    Drug use: Never     Review of Systems   Constitutional: Negative.    HENT: Negative.     Eyes: Negative.    Respiratory: Negative.     Cardiovascular: Negative.  "   Gastrointestinal: Negative.    Genitourinary:  Positive for vaginal bleeding. Negative for dysuria, urgency and vaginal discharge.   Skin: Negative.    Neurological: Negative.    Psychiatric/Behavioral: Negative.     All other systems reviewed and are negative.    Physical Exam     Initial Vitals [07/04/23 0227]   BP Pulse Resp Temp SpO2   (!) 154/77 67 18 97.9 °F (36.6 °C) 98 %      MAP       --         Physical Exam    Nursing note and vitals reviewed.  Constitutional: Vital signs are normal. She appears well-developed and well-nourished. She is cooperative.   HENT:   Head: Normocephalic and atraumatic.   Right Ear: External ear normal.   Left Ear: External ear normal.   Nose: Nose normal.   Mouth/Throat: Oropharynx is clear and moist.   Eyes: Conjunctivae, EOM and lids are normal. Pupils are equal, round, and reactive to light.   Neck: Trachea normal and phonation normal. Neck supple. No JVD present.   Normal range of motion.   Full passive range of motion without pain.     Cardiovascular:  Normal rate, regular rhythm, S1 normal, S2 normal, normal heart sounds, intact distal pulses and normal pulses.           Pulmonary/Chest: Effort normal and breath sounds normal.   Abdominal: Abdomen is soft and flat. Bowel sounds are normal.   Genitourinary:    Genitourinary Comments: (+) pelvic exam with copious amounts of gross sally bleeding     Musculoskeletal:         General: Normal range of motion.      Cervical back: Full passive range of motion without pain, normal range of motion and neck supple.     Neurological: She is alert and oriented to person, place, and time. She has normal strength.   Skin: Skin is warm, dry and intact. Capillary refill takes less than 2 seconds.       ED Course   Procedures  Labs Reviewed   CBC W/ AUTO DIFFERENTIAL - Abnormal; Notable for the following components:       Result Value    RBC 3.09 (*)     Hemoglobin 9.3 (*)     Hematocrit 28.9 (*)     All other components within normal  limits   COMPREHENSIVE METABOLIC PANEL - Abnormal; Notable for the following components:    CO2 17 (*)     All other components within normal limits   URINALYSIS, REFLEX TO URINE CULTURE - Abnormal; Notable for the following components:    Color, UA Red (*)     Appearance, UA Cloudy (*)     All other components within normal limits    Narrative:     Specimen Source->Urine   URINALYSIS MICROSCOPIC - Abnormal; Notable for the following components:    RBC, UA >100 (*)     WBC, UA 20 (*)     Bacteria Few (*)     Non-Squam Epith 2 (*)     All other components within normal limits    Narrative:     Specimen Source->Urine   CULTURE, URINE   TYPE & SCREEN          Imaging Results    None          Medications   sodium chloride 0.9% bolus 1,000 mL 1,000 mL (has no administration in time range)                       Medical Decision Making  Patient had a  section on 2023 per chart review  Patient was admitted to on 2023 for postpartum hemorrhage  Was doing well until this morning with heavy gross menstrual bleeding    Differential Diagnosis  DUB, heavy menstrual cycle, menorrhagia, postpartum hemorrhage    Problems Addressed:  Dysfunctional uterine bleeding: acute illness or injury that poses a threat to life or bodily functions    Amount and/or Complexity of Data Reviewed  Independent Historian: spouse  External Data Reviewed: labs, radiology and notes.  Labs: ordered. Decision-making details documented in ED Course.    ED Management & Risk of Complications, Morbidity, Mortality:  Stable hemoglobin of 9.1 with gross bleeding on my pelvic exam  IV fluids, type & screen, I discussed with OBGYN Dr. Montero  Continued bleeding/postpartum hemorrhage, plan for D&C today    Critical Care ED Physician Time (minutes):  -- Performed by: Sylvester Tan M.D.  -- Date/Time: 6:34 AM 2023   -- Direct Patient Care (Face Time): 5  -- Additional History from Records or Taking Additional History: 5  -- Ordering,  Reviewing, and Interpreting Diagnostic Studies: 5  -- Total Time in Documentation: 11  -- Consultation with Other Physicians: 5  -- Consultation with Family Related to Condition: 0  -- Total Critical Care Time: 31  -- Critical care was necessary to treat bleeding postoperatively  -- Critical care was time spent personally by me on the following activities:   -- discussions with consultants regarding treatment plan today  -- development of treatment plan with patient & their family  -- examination of patient, ordering and performing treatments   -- review of radiographic studies, re-evaluation of pt's condition  -- review of labs and evaluation of response to treatment             Clinical Impression:   Final diagnoses:  [N93.8] Dysfunctional uterine bleeding (Primary)        ED Disposition Condition    Observation Stable                Sylvester Tan MD  07/04/23 0607

## 2023-07-04 NOTE — TRANSFER OF CARE
Anesthesia Transfer of Care Note    Patient: Shawna Oseguera    Procedure(s) Performed: Procedure(s) (LRB):  DILATION AND CURETTAGE, UTERUS (N/A)    Patient location: PACU    Anesthesia Type: general    Transport from OR: Transported from OR on 6-10 L/min O2 by face mask with adequate spontaneous ventilation    Post pain: adequate analgesia    Post assessment: no apparent anesthetic complications and tolerated procedure well    Post vital signs: stable    Level of consciousness: sedated    Nausea/Vomiting: no nausea/vomiting    Complications: none    Transfer of care protocol was followed      Last vitals:   Visit Vitals  /72   Pulse 74   Temp 36.2 °C (97.2 °F)   Resp 16   Wt 109 kg (240 lb 4.8 oz)   SpO2 98%   BMI 43.95 kg/m²

## 2023-07-04 NOTE — ANESTHESIA PREPROCEDURE EVALUATION
07/04/2023  Shawna Oseguera is a 28 y.o., female.      Pre-op Assessment    I have reviewed the Patient Summary Reports.     I have reviewed the Nursing Notes. I have reviewed the NPO Status.   I have reviewed the Medications.     Review of Systems  Anesthesia Hx:  No problems with previous Anesthesia  History of prior surgery of interest to airway management or planning:  Denies Personal Hx of Anesthesia complications.   Social:  Non-Smoker, No Alcohol Use    Hematology/Oncology:  Hematology Normal   Oncology Normal     EENT/Dental:EENT/Dental Normal   Cardiovascular:   Exercise tolerance: good Hypertension, well controlled    Pulmonary:  Pulmonary Normal    Renal/:  Renal/ Normal     Hepatic/GI:  Hepatic/GI Normal    Musculoskeletal:  Musculoskeletal Normal    Neurological:  Neurology Normal    Endocrine:   Diabetes, well controlled, gestational  Morbid Obesity / BMI > 40  Dermatological:  Skin Normal    Psych:  Psychiatric Normal           Physical Exam  General: Well nourished, Cooperative, Alert and Oriented    Airway:  Mallampati: III   Mouth Opening: Normal  TM Distance: Normal  Tongue: Large  Neck ROM: Normal ROM    Dental:  Intact        Anesthesia Plan  Type of Anesthesia, risks & benefits discussed:    Anesthesia Type: Gen ETT  Intra-op Monitoring Plan: Standard ASA Monitors  Post Op Pain Control Plan: multimodal analgesia and intrathecal opioid  Informed Consent: Informed consent signed with the Patient and all parties understand the risks and agree with anesthesia plan.  All questions answered. Patient consented to blood products? Yes  ASA Score: 3 Emergent  Day of Surgery Review of History & Physical: H&P Update referred to the surgeon/provider.I have interviewed and examined the patient. I have reviewed the patient's H&P dated: 7/4/23. There are no significant changes.   Anesthesia  Plan Notes:  used for consent and explanation of anesthesia, video  system to be used in OR    Ready For Surgery From Anesthesia Perspective.     .

## 2023-07-05 LAB — BACTERIA UR CULT: NORMAL

## 2023-07-06 ENCOUNTER — OFFICE VISIT (OUTPATIENT)
Dept: OBSTETRICS AND GYNECOLOGY | Facility: CLINIC | Age: 28
End: 2023-07-06
Payer: MEDICAID

## 2023-07-06 VITALS
HEART RATE: 66 BPM | BODY MASS INDEX: 44.53 KG/M2 | HEIGHT: 62 IN | SYSTOLIC BLOOD PRESSURE: 140 MMHG | DIASTOLIC BLOOD PRESSURE: 76 MMHG | WEIGHT: 242 LBS

## 2023-07-06 DIAGNOSIS — D50.0 IRON DEFICIENCY ANEMIA DUE TO CHRONIC BLOOD LOSS: ICD-10-CM

## 2023-07-06 DIAGNOSIS — Z98.51 S/P TUBAL LIGATION: ICD-10-CM

## 2023-07-06 DIAGNOSIS — Z98.891 S/P CESAREAN SECTION: Primary | ICD-10-CM

## 2023-07-06 PROCEDURE — 99999 PR PBB SHADOW E&M-EST. PATIENT-LVL II: CPT | Mod: PBBFAC,,, | Performed by: OBSTETRICS & GYNECOLOGY

## 2023-07-06 PROCEDURE — 3008F BODY MASS INDEX DOCD: CPT | Mod: CPTII,,, | Performed by: OBSTETRICS & GYNECOLOGY

## 2023-07-06 PROCEDURE — 99024 POSTOP FOLLOW-UP VISIT: CPT | Mod: ,,, | Performed by: OBSTETRICS & GYNECOLOGY

## 2023-07-06 PROCEDURE — 99212 OFFICE O/P EST SF 10 MIN: CPT | Mod: PBBFAC,TH | Performed by: OBSTETRICS & GYNECOLOGY

## 2023-07-06 PROCEDURE — 3078F PR MOST RECENT DIASTOLIC BLOOD PRESSURE < 80 MM HG: ICD-10-PCS | Mod: CPTII,,, | Performed by: OBSTETRICS & GYNECOLOGY

## 2023-07-06 PROCEDURE — 3008F PR BODY MASS INDEX (BMI) DOCUMENTED: ICD-10-PCS | Mod: CPTII,,, | Performed by: OBSTETRICS & GYNECOLOGY

## 2023-07-06 PROCEDURE — 99024 PR POST-OP FOLLOW-UP VISIT: ICD-10-PCS | Mod: ,,, | Performed by: OBSTETRICS & GYNECOLOGY

## 2023-07-06 PROCEDURE — 3077F PR MOST RECENT SYSTOLIC BLOOD PRESSURE >= 140 MM HG: ICD-10-PCS | Mod: CPTII,,, | Performed by: OBSTETRICS & GYNECOLOGY

## 2023-07-06 PROCEDURE — 3078F DIAST BP <80 MM HG: CPT | Mod: CPTII,,, | Performed by: OBSTETRICS & GYNECOLOGY

## 2023-07-06 PROCEDURE — 3077F SYST BP >= 140 MM HG: CPT | Mod: CPTII,,, | Performed by: OBSTETRICS & GYNECOLOGY

## 2023-07-06 PROCEDURE — 99999 PR PBB SHADOW E&M-EST. PATIENT-LVL II: ICD-10-PCS | Mod: PBBFAC,,, | Performed by: OBSTETRICS & GYNECOLOGY

## 2023-07-06 RX ORDER — FERROUS SULFATE 325(65) MG
325 TABLET, DELAYED RELEASE (ENTERIC COATED) ORAL DAILY
Qty: 30 TABLET | Refills: 3 | Status: SHIPPED | OUTPATIENT
Start: 2023-07-06

## 2023-07-06 NOTE — PROGRESS NOTES
Subjective:    Patient ID: Shawna Oseguera is a 28 y.o. y.o. female    Chief Complaint:   Chief Complaint   Patient presents with    Post-op Evaluation     State she is feeling much better, denies signs of infection. Is still having some bleeding with clots but is unable to see the size of them states they only come out when she urinates.       History of Present Illness:  Shawna presents today for follow up of D&C on 2023 for excessive vaginal bleeding. States she was passing clots but is now doing much better. Bonding with baby      Review of Systems   Constitutional:  Negative for chills, fatigue and fever.   Respiratory:  Negative for shortness of breath.    Cardiovascular:  Negative for chest pain.   Gastrointestinal:  Positive for abdominal pain (cramping). Negative for constipation, diarrhea and nausea.   Genitourinary:  Positive for vaginal bleeding. Negative for bladder incontinence, dysuria, hot flashes and pelvic pain.   Neurological:  Negative for headaches.   Psychiatric/Behavioral:  Negative for depression.        Objective:    Vital Signs:  Vitals:    23 0929   BP: (!) 140/76   Pulse: 66     Wt Readings from Last 1 Encounters:   23 109.8 kg (242 lb)     Body mass index is 44.26 kg/m².    Physical Exam:  General:  alert, no distress   Skin:  Skin color, texture, turgor normal. No rashes or lesions   Abdomen:  Soft, nontender   Extremities: No cyanosis, clubbing, edema         Reassurance and encouragement given.  Activities and limitations reiterated.  All questions answered          Assessment:      1. S/P  section    2. Delayed postpartum hemorrhage    3. Iron deficiency anemia due to chronic blood loss    4. S/P tubal ligation          Plan:      S/P  section    Delayed postpartum hemorrhage    Iron deficiency anemia due to chronic blood loss  -     ferrous sulfate 325 (65 FE) MG EC tablet; Take 1 tablet (325 mg total) by mouth once daily.  Dispense: 30  tablet; Refill: 3    S/P tubal ligation    Rtc in 3-4 weeks as scheduled for PPE       Giuliana Matthews MD, FACOG   07/06/2023 9:55 AM

## 2023-07-12 LAB
FINAL PATHOLOGIC DIAGNOSIS: NORMAL
GROSS: NORMAL
Lab: NORMAL

## 2023-07-31 ENCOUNTER — POSTPARTUM VISIT (OUTPATIENT)
Dept: OBSTETRICS AND GYNECOLOGY | Facility: CLINIC | Age: 28
End: 2023-07-31
Payer: MEDICAID

## 2023-07-31 VITALS
WEIGHT: 240 LBS | HEART RATE: 57 BPM | BODY MASS INDEX: 43.9 KG/M2 | SYSTOLIC BLOOD PRESSURE: 118 MMHG | DIASTOLIC BLOOD PRESSURE: 64 MMHG

## 2023-07-31 DIAGNOSIS — O24.415 GESTATIONAL DIABETES MELLITUS (GDM) IN THIRD TRIMESTER CONTROLLED ON ORAL HYPOGLYCEMIC DRUG: ICD-10-CM

## 2023-07-31 DIAGNOSIS — Z98.51 S/P TUBAL LIGATION: ICD-10-CM

## 2023-07-31 LAB
GLUCOSE SERPL-MCNC: 101 MG/DL
GLUCOSE SERPL-MCNC: 231 MG/DL
GLUCOSE SERPL-MCNC: 95 MG/DL (ref 70–110)

## 2023-07-31 PROCEDURE — 99999 PR PBB SHADOW E&M-EST. PATIENT-LVL II: CPT | Mod: PBBFAC,,, | Performed by: OBSTETRICS & GYNECOLOGY

## 2023-07-31 PROCEDURE — 99212 OFFICE O/P EST SF 10 MIN: CPT | Mod: PBBFAC,TH | Performed by: OBSTETRICS & GYNECOLOGY

## 2023-07-31 PROCEDURE — 0503F POSTPARTUM CARE VISIT: CPT | Mod: ,,, | Performed by: OBSTETRICS & GYNECOLOGY

## 2023-07-31 PROCEDURE — 99999 PR PBB SHADOW E&M-EST. PATIENT-LVL II: ICD-10-PCS | Mod: PBBFAC,,, | Performed by: OBSTETRICS & GYNECOLOGY

## 2023-07-31 PROCEDURE — 0503F PR POSTPARTUM CARE VISIT: ICD-10-PCS | Mod: ,,, | Performed by: OBSTETRICS & GYNECOLOGY

## 2023-07-31 PROCEDURE — 82951 GLUCOSE TOLERANCE TEST (GTT): CPT | Performed by: OBSTETRICS & GYNECOLOGY

## 2023-07-31 NOTE — PROGRESS NOTES
Subjective:    Patient ID: Shawna Oseguera is a 28 y.o. y.o. female.     Chief Complaint:   Chief Complaint   Patient presents with    Postpartum Care     2 hr gtt done       History of Present Illness:  Shawna Oseguera is a 28 y.o.  who is here today for postpartum follow up exam.    EPDS = 0    Has questions as she is not received birth certificate or social security card for baby yet.  Asking if there is a number she can call      Baby Birth Data:  Delivery Information for Boy Shawna Oseguera     Birth information:  YOB: [birth year]23   Time of birth: 7:41 AM   Sex: male   Head Delivery Date/Time: 2023  7:41 AM   Delivery type: , Low Transverse   Gestational Age: 37w2d        Delivery Providers    Delivering clinician: Giuliana Matthews MD         Measurements    Weight: 3657 g  Weight (lbs): 8 lb 1 oz      Feeding:  bottle and breast    Shawna has no complaints today.  She notes some fatigue.  No fever, heavy bleeding, pain.  He did her 2 hour glucose tolerance test evaluation for diabetes    Objective:     Exam:  /64   Pulse (!) 57   Wt 108.9 kg (240 lb)   BMI 43.90 kg/m²   HEENT:  negative  Breasts:  normal appearance, no masses or tenderness  Heart:   regular rate and rhythm, no murmer  Lungs:  clear to auscultation  Abdomen:  Soft, nontender. Pfannensteil healed nicely, no signs of infection, scar looks good  Pelvic:     Vulva, vagina normal in appearance.   Cervix normal in appearance.    Uterus normal in size, mobile, nontender.   Adnexa without masses or tenderness.    Pap: Not Done.      Assessment:     Routine Post Partum Followup  Tubal Ligation status    Plan:     1.  Contraceptive counseling provided: Patient has chosen bilateral tubal ligation  2.  Follow up in  6 months  3.  Call with any concerns or problems.      Giuliana Matthews MD FACOG    2023  1:57 PM

## 2023-07-31 NOTE — PROGRESS NOTES
Please call patient regarding results.  Levels do not indicate diabetes. However, continue to eat a lower carb diet

## 2024-05-09 ENCOUNTER — CLINICAL SUPPORT (OUTPATIENT)
Dept: OBSTETRICS AND GYNECOLOGY | Facility: CLINIC | Age: 29
End: 2024-05-09

## 2024-05-09 DIAGNOSIS — N30.01 ACUTE CYSTITIS WITH HEMATURIA: ICD-10-CM

## 2024-05-09 DIAGNOSIS — R35.0 URINE FREQUENCY: ICD-10-CM

## 2024-05-09 DIAGNOSIS — R30.0 DYSURIA: Primary | ICD-10-CM

## 2024-05-09 LAB
BILIRUB SERPL-MCNC: NEGATIVE MG/DL
BLOOD, POC UA: NORMAL
GLUCOSE UR QL STRIP: NEGATIVE
KETONES UR QL STRIP: NEGATIVE
LEUKOCYTE ESTERASE URINE, POC: NORMAL
NITRITE, POC UA: POSITIVE
PH, POC UA: 5.5
PROTEIN, POC: NORMAL
SPECIFIC GRAVITY, POC UA: 1.02
UROBILINOGEN, POC UA: NORMAL

## 2024-05-09 PROCEDURE — 99999PBSHW POCT URINALYSIS: Mod: PBBFAC,,,

## 2024-05-09 PROCEDURE — 87077 CULTURE AEROBIC IDENTIFY: CPT | Performed by: OBSTETRICS & GYNECOLOGY

## 2024-05-09 PROCEDURE — 87186 SC STD MICRODIL/AGAR DIL: CPT | Performed by: OBSTETRICS & GYNECOLOGY

## 2024-05-09 PROCEDURE — 87086 URINE CULTURE/COLONY COUNT: CPT | Performed by: OBSTETRICS & GYNECOLOGY

## 2024-05-09 PROCEDURE — 99212 OFFICE O/P EST SF 10 MIN: CPT | Mod: S$PBB,,, | Performed by: OBSTETRICS & GYNECOLOGY

## 2024-05-09 PROCEDURE — 87088 URINE BACTERIA CULTURE: CPT | Performed by: OBSTETRICS & GYNECOLOGY

## 2024-05-09 PROCEDURE — 81003 URINALYSIS AUTO W/O SCOPE: CPT | Mod: PBBFAC

## 2024-05-09 RX ORDER — NITROFURANTOIN 25; 75 MG/1; MG/1
100 CAPSULE ORAL 2 TIMES DAILY
Qty: 14 CAPSULE | Refills: 0 | Status: SHIPPED | OUTPATIENT
Start: 2024-05-09 | End: 2024-05-16

## 2024-05-09 NOTE — PROGRESS NOTES
SUBJECTIVE: Shawna Oseguera is a 29 y.o. female who complains of urinary frequency, urgency and dysuria x a few days, without flank pain, fever, chills, or abnormal vaginal discharge or bleeding.     OBJECTIVE: Appears well, in no apparent distress.  Vital signs are normal. The abdomen is soft without tenderness, guarding, mass, rebound or organomegaly. No CVA tenderness or inguinal adenopathy noted. Urine dipstick shows positive for WBC's, positive for RBC's, positive for protein, positive for nitrates, and positive for leukocytes.  Micro exam: not done.     ASSESSMENT: UTI uncomplicated without evidence of pyelonephritis    PLAN: Treatment per orders - also push fluids, may use Pyridium OTC prn. Call or return to clinic prn if these symptoms worsen or fail to improve as anticipated.

## 2024-05-12 LAB — BACTERIA UR CULT: ABNORMAL

## 2024-05-27 PROBLEM — O09.299 PREGNANCY WITH POOR OBSTETRIC HISTORY: Status: RESOLVED | Noted: 2023-06-15 | Resolved: 2024-05-27

## (undated) DEVICE — UNDERGLOVE BIOGEL PI SZ 6.5 LF

## (undated) DEVICE — GLOVE BIOGEL PI MICRO SZ 6

## (undated) DEVICE — SCRUB HIBICLENS 4% CHG 4OZ

## (undated) DEVICE — DRESSING TELFA N ADH 3X8

## (undated) DEVICE — SET DISPOSABLE COLLECTION

## (undated) DEVICE — GOWN POLY REINF BRTH SLV XL

## (undated) DEVICE — DRESSING AQUACEL AG 3.5X10IN

## (undated) DEVICE — TRAY CATH URET CURITY 59-86F

## (undated) DEVICE — GLOVE BIOGEL PIMICRO INDIC 6.5

## (undated) DEVICE — TUBING SUC UNIV W/CONN 12FT

## (undated) DEVICE — VACURETTE 8MM CURVED

## (undated) DEVICE — SYS KIWI OMNICUP VACUUM DEL

## (undated) DEVICE — Device

## (undated) DEVICE — PAD SANITARY OB STERILE